# Patient Record
Sex: MALE | Race: WHITE | NOT HISPANIC OR LATINO | Employment: OTHER | URBAN - NONMETROPOLITAN AREA
[De-identification: names, ages, dates, MRNs, and addresses within clinical notes are randomized per-mention and may not be internally consistent; named-entity substitution may affect disease eponyms.]

---

## 2017-01-04 ENCOUNTER — HOSPITAL ENCOUNTER (OUTPATIENT)
Dept: CT IMAGING | Facility: HOSPITAL | Age: 77
Discharge: HOME | End: 2017-01-04
Attending: INTERNAL MEDICINE | Admitting: INTERNAL MEDICINE

## 2017-01-04 DIAGNOSIS — R04.2 HEMOPTYSIS: ICD-10-CM

## 2017-01-04 LAB — CREAT BLDA-MCNC: 1.3 MG/DL (ref 0.6–1.3)

## 2017-01-04 PROCEDURE — 71270 CT THORAX DX C-/C+: CPT

## 2017-01-04 PROCEDURE — 0 IOPAMIDOL 61 % SOLUTION: Performed by: INTERNAL MEDICINE

## 2017-01-04 PROCEDURE — 82565 ASSAY OF CREATININE: CPT

## 2017-01-04 RX ADMIN — IOPAMIDOL 100 ML: 612 INJECTION, SOLUTION INTRAVENOUS at 10:15

## 2017-01-05 ENCOUNTER — TELEPHONE (OUTPATIENT)
Dept: CARDIOLOGY | Facility: CLINIC | Age: 77
End: 2017-01-05

## 2017-01-05 NOTE — TELEPHONE ENCOUNTER
Elvia from Dr. Joyce's office called because pt is asking if Dr. Joyce could fill his Savaysa?  I told Elvia that since we are the ones that started him on this that we should be the ones giving him the rx.  We gave him samples and he was suppose to let us know if he was doing ok so we could send in a rx and give him the $4 coupon card (which he got in the samples).  Pt did have some problems with the med to where he coughed up some blood and he had a ct scan of the chest.  We will check on this and let pt know the outcome and if he needs to stay on the Savaysa or not.  Reese Carrasco, CMA

## 2017-01-10 DIAGNOSIS — I48.20 CHRONIC ATRIAL FIBRILLATION (HCC): Primary | ICD-10-CM

## 2017-01-10 DIAGNOSIS — R91.1 SOLITARY PULMONARY NODULE ON LUNG CT: ICD-10-CM

## 2017-01-10 NOTE — TELEPHONE ENCOUNTER
I called pt with results of the ct and told him Dr. Nguyen wanted to refer him to a pulmonologist.  I sent an order for Dr. Nguyen to sign.      Pt also is asking if we can change the Savaysa to something his insurance will cover and wants us to send in Eliquis because he thinks its on his formulary.  I told pt I would ask. Reese Carrasco, CMA

## 2017-01-19 ENCOUNTER — HOSPITAL ENCOUNTER (OUTPATIENT)
Dept: NUCLEAR MEDICINE | Age: 77
Discharge: HOME | End: 2017-01-19
Payer: MEDICARE

## 2017-01-19 DIAGNOSIS — R91.1 SOLITARY LUNG NODULE: ICD-10-CM

## 2017-01-19 LAB
GLUCOSE BLD-MCNC: 164 MG/DL (ref 70–99)
PERFORMED ON: ABNORMAL

## 2017-01-19 PROCEDURE — 3430000000 HC RX DIAGNOSTIC RADIOPHARMACEUTICAL: Performed by: INTERNAL MEDICINE

## 2017-01-19 PROCEDURE — A9552 F18 FDG: HCPCS | Performed by: INTERNAL MEDICINE

## 2017-01-19 PROCEDURE — 82948 REAGENT STRIP/BLOOD GLUCOSE: CPT

## 2017-01-19 PROCEDURE — 78815 PET IMAGE W/CT SKULL-THIGH: CPT

## 2017-01-19 RX ORDER — FLUDEOXYGLUCOSE F 18 200 MCI/ML
10 INJECTION, SOLUTION INTRAVENOUS
Status: COMPLETED | OUTPATIENT
Start: 2017-01-19 | End: 2017-01-19

## 2017-01-19 RX ADMIN — FLUDEOXYGLUCOSE F 18 10 MILLICURIE: 200 INJECTION, SOLUTION INTRAVENOUS at 12:26

## 2017-08-01 ENCOUNTER — OFFICE VISIT (OUTPATIENT)
Dept: CARDIOLOGY | Facility: CLINIC | Age: 77
End: 2017-08-01

## 2017-08-01 VITALS
DIASTOLIC BLOOD PRESSURE: 62 MMHG | BODY MASS INDEX: 34.63 KG/M2 | WEIGHT: 233.8 LBS | HEART RATE: 62 BPM | HEIGHT: 69 IN | SYSTOLIC BLOOD PRESSURE: 126 MMHG

## 2017-08-01 DIAGNOSIS — I48.0 PAROXYSMAL ATRIAL FIBRILLATION (HCC): ICD-10-CM

## 2017-08-01 DIAGNOSIS — R06.02 SHORTNESS OF BREATH: ICD-10-CM

## 2017-08-01 DIAGNOSIS — I25.810 CORONARY ARTERY DISEASE INVOLVING CORONARY BYPASS GRAFT OF NATIVE HEART WITHOUT ANGINA PECTORIS: Primary | ICD-10-CM

## 2017-08-01 DIAGNOSIS — R06.09 DYSPNEA ON EXERTION: ICD-10-CM

## 2017-08-01 DIAGNOSIS — R07.9 CHEST PAIN, UNSPECIFIED TYPE: ICD-10-CM

## 2017-08-01 DIAGNOSIS — I34.2 NON-RHEUMATIC MITRAL VALVE STENOSIS: ICD-10-CM

## 2017-08-01 PROCEDURE — 99214 OFFICE O/P EST MOD 30 MIN: CPT | Performed by: PHYSICIAN ASSISTANT

## 2017-08-01 RX ORDER — TAMSULOSIN HYDROCHLORIDE 0.4 MG/1
1 CAPSULE ORAL NIGHTLY
Status: ON HOLD | COMMUNITY
End: 2017-11-29

## 2017-08-01 RX ORDER — WARFARIN SODIUM 5 MG/1
5 TABLET ORAL
COMMUNITY

## 2017-08-01 RX ORDER — NITROGLYCERIN 0.4 MG/1
TABLET SUBLINGUAL
Qty: 30 TABLET | Refills: 0 | Status: SHIPPED | OUTPATIENT
Start: 2017-08-01

## 2017-08-01 RX ORDER — FUROSEMIDE 80 MG
40 TABLET ORAL 2 TIMES DAILY
COMMUNITY

## 2017-08-01 RX ORDER — ATORVASTATIN CALCIUM 20 MG/1
20 TABLET, FILM COATED ORAL DAILY
COMMUNITY

## 2017-08-01 RX ORDER — CLOPIDOGREL BISULFATE 75 MG/1
75 TABLET ORAL DAILY
COMMUNITY
End: 2017-11-17

## 2017-08-01 NOTE — PROGRESS NOTES
"    Subjective:     Encounter Date:08/01/2017      Patient ID: Yovani Butler is a 77 y.o. male w hx of coronary artery disease, PAF who [facility]-up. He relates he's had worsening dyspnea on exertion for the past month. He states \"I can hardly walk to the mailbox anymore.\" He reports associated chest pain. He describes this as centrally located, dull ache, at worst 6/10. He denies any radiation of pain. He states he rests for this issue; he denies having any nitroglycerin to take. He also reports dyspnea \"all the time\" but worsened with exertion as noted above. He denies any syncope, leg swelling or similar.     Chief Complaint:  Coronary Artery Disease   Presents for follow-up visit. Symptoms include chest pain and shortness of breath. Pertinent negatives include no dizziness, leg swelling, palpitations or weight gain. Risk factors include premature CAD in family. The symptoms have been stable. Compliance with diet is variable. Compliance with exercise is poor. Compliance with medications is good.   Chest Pain    This is a recurrent problem. The current episode started more than 1 year ago. The onset quality is gradual. The problem occurs every several days. The problem has been gradually worsening. The pain is present in the substernal region. The pain is at a severity of 6/10. The pain is mild. The quality of the pain is described as dull. The pain does not radiate. Associated symptoms include shortness of breath. Pertinent negatives include no claudication, dizziness, hemoptysis, irregular heartbeat, lower extremity edema, malaise/fatigue, nausea, near-syncope, orthopnea, palpitations, PND, syncope, vomiting or weakness. The pain is aggravated by exertion. He has tried rest for the symptoms. The treatment provided moderate relief. Risk factors include male gender.   His past medical history is significant for CAD.   His family medical history is significant for CAD. Prior " diagnostic workup includes Persantine thallium.   Atrial Fibrillation   Presents for follow-up visit. Symptoms include chest pain and shortness of breath. Symptoms are negative for dizziness, palpitations, syncope and weakness. The symptoms have been stable. Past medical history includes atrial fibrillation and CAD. There are no medication compliance problems.   Shortness of Breath   This is a recurrent problem. The current episode started more than 1 month ago. The problem occurs constantly. The problem has been gradually worsening. Associated symptoms include chest pain. Pertinent negatives include no claudication, hemoptysis, leg swelling, orthopnea, PND, syncope or vomiting. The symptoms are aggravated by any activity. The patient has no known risk factors for DVT/PE. The treatment provided mild relief. His past medical history is significant for CAD.       The following portions of the patient's history were reviewed and updated as appropriate: allergies, current medications, past family history, past medical history, past social history, past surgical history and problem list.    Review of Systems   Constitution: Negative for weakness, malaise/fatigue and weight gain.   Cardiovascular: Positive for chest pain and dyspnea on exertion. Negative for claudication, irregular heartbeat, leg swelling, near-syncope, orthopnea, palpitations, paroxysmal nocturnal dyspnea and syncope.   Respiratory: Positive for shortness of breath. Negative for hemoptysis.    Hematologic/Lymphatic: Negative for bleeding problem.   Skin: Negative for poor wound healing.   Musculoskeletal: Negative for myalgias.   Gastrointestinal: Negative for melena, nausea and vomiting.   Genitourinary: Negative for hematuria.   Neurological: Negative for excessive daytime sleepiness, dizziness, focal weakness and light-headedness.   Psychiatric/Behavioral: Negative for memory loss.   All other systems reviewed and are negative.        ECG 12  "Lead  Date/Time: 8/7/2017 12:23 PM  Performed by: EDIL DAVIS  Authorized by: EDIL DAVIS   Comparison: compared with previous ECG from 9/2/2016  Comparison to previous ECG: Atrial fibrillation with rapid ventricular response has replaced sinus bradycardia  Rhythm: atrial fibrillation  Rate: normal  QRS axis: normal  Clinical impression: abnormal ECG               Objective:     Physical Exam   Constitutional: He is oriented to person, place, and time. He appears well-developed and well-nourished.   HENT:   Head: Normocephalic and atraumatic.   Eyes: Conjunctivae and EOM are normal. Pupils are equal, round, and reactive to light.   Neck: Normal range of motion. Neck supple. No JVD present.   Cardiovascular: Normal rate, regular rhythm, S1 normal, S2 normal, intact distal pulses and normal pulses.    Murmur heard.   Systolic murmur is present with a grade of 2/6  at the upper right sternal border, apex  Pulmonary/Chest: Effort normal and breath sounds normal. No respiratory distress.   Abdominal: Soft. Bowel sounds are normal. He exhibits no distension.   Musculoskeletal: He exhibits no edema.   Neurological: He is alert and oriented to person, place, and time.   Skin: Skin is warm and dry.   Psychiatric: He has a normal mood and affect. Judgment normal.   Vitals reviewed.       /62  Pulse 62  Ht 69\" (175.3 cm)  Wt 233 lb 12.8 oz (106 kg)  BMI 34.53 kg/m2    Current Outpatient Prescriptions:   •  atorvastatin (LIPITOR) 20 MG tablet, Take 20 mg by mouth Daily., Disp: , Rfl:   •  clopidogrel (PLAVIX) 75 MG tablet, Take 75 mg by mouth Daily., Disp: , Rfl:   •  digoxin (LANOXIN) 125 MCG tablet, Take  by mouth every day., Disp: , Rfl:   •  famotidine (PEPCID) 20 MG tablet, Take  by mouth daily., Disp: , Rfl:   •  furosemide (LASIX) 80 MG tablet, Take 80 mg by mouth 2 (Two) Times a Day., Disp: , Rfl:   •  glipiZIDE (GLUCOTROL) 10 MG tablet, Take 10 mg by mouth 2 (Two) Times a Day Before Meals., Disp: , Rfl: "   •  LEVOTHYROXINE SODIUM PO, Take  by mouth daily., Disp: , Rfl:   •  metoprolol tartrate (LOPRESSOR) 100 MG tablet, Take 100 mg by mouth 2 (Two) Times a Day., Disp: , Rfl:   •  tamsulosin (FLOMAX) 0.4 MG capsule 24 hr capsule, Take 1 capsule by mouth Every Night., Disp: , Rfl:   •  Unable to find, Med Name: oxygen at night, Disp: , Rfl:   •  warfarin (COUMADIN) 5 MG tablet, Take 5 mg by mouth Daily. MWF- 7mg Dr. Joyce regulates, Disp: , Rfl:   •  aspirin 81 MG tablet, Take 1 tablet by mouth Daily., Disp: 30 tablet, Rfl: 11  •  nitroglycerin (NITROSTAT) 0.4 MG SL tablet, 1 under the tongue as needed for angina, may repeat q5mins for up three doses, Disp: 30 tablet, Rfl: 0  Past Medical History:   Diagnosis Date   • Abnormal glucose     nec   • AF (atrial fibrillation)    • Aortic stenosis    • Aortic valve replaced    • CAD in native artery    • Chest pain    • Diabetes mellitus type 2, uncomplicated    • Esophageal reflux    • Exposure to asbestos     hx personal exposure to asbestos   • Hyperlipidemia    • Hypertension    • Mitral stenosis    • Murmur    • Obesity     nos   • Renal artery stenosis    • Sleep apnea, obstructive     see sleep study 7/12 - intolerant to multiple masks/Bipap   • SOB (shortness of breath)      Past Surgical History:   Procedure Laterality Date   • ADENOIDECTOMY     • BREAST SURGERY      benign tumor left breast   • BYPASS GRAFT  06/16/2009    aortocoronary bypass x1   • CARDIAC VALVE REPLACEMENT  06/16/2009    transplant, heart valve: aortiv valve   • CORONARY ANGIOPLASTY     • CORONARY STENT PLACEMENT     • HERNIA REPAIR     • TONSILLECTOMY       No Known Allergies  Social History     Social History   • Marital status:      Spouse name: N/A   • Number of children: N/A   • Years of education: N/A     Occupational History   • Not on file.     Social History Main Topics   • Smoking status: Former Smoker     Quit date: 1972   • Smokeless tobacco: Former User   • Alcohol use No    • Drug use: No   • Sexual activity: Defer     Other Topics Concern   • Not on file     Social History Narrative     Family History   Problem Relation Age of Onset   • Coronary artery disease Other    • Diabetes Other    • Hypertension Other    • Clotting disorder Mother    • Brain cancer Father    • No Known Problems Sister    • Cancer Brother    • Heart disease Maternal Grandfather    • Diabetes Maternal Grandfather    • Heart disease Sister    • Hypertension Sister    • Alzheimer's disease Sister    • Heart disease Sister    • Heart failure Sister    • Heart attack Sister             Assessment:          Diagnosis Plan   1. Coronary artery disease involving coronary bypass graft of native heart without angina pectoris     2. Paroxysmal atrial fibrillation     3. Non-rheumatic mitral valve stenosis     4. Chest pain, unspecified type  Stress Test With Myocardial Perfusion   5. Dyspnea on exertion  Stress Test With Myocardial Perfusion   6. Shortness of breath  Adult Transthoracic Echo Complete With Contrast          Plan:       1. NTG as needed, up to 3 within 15 minutes, if chest pain unrelieved go to ER.   2. Instructed patient he should only be taking Lopressor 100 mg twice daily.   3. Lexiscan next week  4. Echo tomorrow  5. Follow-up in 3 months with Dr. Nguyen unless needed sooner- any increase in chest pain, dyspnea or related, please call/return sooner  6. Verbalized understanding of instructions.

## 2017-08-07 PROCEDURE — 93000 ELECTROCARDIOGRAM COMPLETE: CPT | Performed by: PHYSICIAN ASSISTANT

## 2017-08-14 ENCOUNTER — HOSPITAL ENCOUNTER (OUTPATIENT)
Dept: CARDIOLOGY | Facility: HOSPITAL | Age: 77
Discharge: HOME | End: 2017-08-14

## 2017-08-14 ENCOUNTER — HOSPITAL ENCOUNTER (OUTPATIENT)
Dept: CARDIOLOGY | Facility: HOSPITAL | Age: 77
Discharge: HOME | End: 2017-08-14
Admitting: PHYSICIAN ASSISTANT

## 2017-08-14 VITALS — DIASTOLIC BLOOD PRESSURE: 78 MMHG | HEART RATE: 52 BPM | SYSTOLIC BLOOD PRESSURE: 140 MMHG

## 2017-08-14 VITALS
HEIGHT: 69 IN | BODY MASS INDEX: 34.51 KG/M2 | WEIGHT: 233 LBS | DIASTOLIC BLOOD PRESSURE: 62 MMHG | SYSTOLIC BLOOD PRESSURE: 126 MMHG

## 2017-08-14 DIAGNOSIS — R06.09 DYSPNEA ON EXERTION: ICD-10-CM

## 2017-08-14 DIAGNOSIS — R07.9 CHEST PAIN, UNSPECIFIED TYPE: ICD-10-CM

## 2017-08-14 DIAGNOSIS — R06.02 SHORTNESS OF BREATH: ICD-10-CM

## 2017-08-14 LAB
BH CV ECHO MEAS - AO MAX PG (FULL): 14 MMHG
BH CV ECHO MEAS - AO MAX PG: 17.5 MMHG
BH CV ECHO MEAS - AO MEAN PG (FULL): 6 MMHG
BH CV ECHO MEAS - AO MEAN PG: 8 MMHG
BH CV ECHO MEAS - AO ROOT AREA (BSA CORRECTED): 1.3
BH CV ECHO MEAS - AO ROOT AREA: 6.6 CM^2
BH CV ECHO MEAS - AO ROOT DIAM: 2.9 CM
BH CV ECHO MEAS - AO V2 MAX: 209 CM/SEC
BH CV ECHO MEAS - AO V2 MEAN: 119 CM/SEC
BH CV ECHO MEAS - AO V2 VTI: 48.1 CM
BH CV ECHO MEAS - AVA(I,A): 1.8 CM^2
BH CV ECHO MEAS - AVA(I,D): 1.8 CM^2
BH CV ECHO MEAS - AVA(V,A): 1.6 CM^2
BH CV ECHO MEAS - AVA(V,D): 1.6 CM^2
BH CV ECHO MEAS - BSA(HAYCOCK): 2.3 M^2
BH CV ECHO MEAS - BSA: 2.2 M^2
BH CV ECHO MEAS - BZI_BMI: 34.4 KILOGRAMS/M^2
BH CV ECHO MEAS - BZI_METRIC_HEIGHT: 175.3 CM
BH CV ECHO MEAS - BZI_METRIC_WEIGHT: 105.7 KG
BH CV ECHO MEAS - CONTRAST EF 4CH: 60 ML/M^2
BH CV ECHO MEAS - EDV(CUBED): 148.9 ML
BH CV ECHO MEAS - EDV(MOD-SP4): 106 ML
BH CV ECHO MEAS - EDV(TEICH): 135.3 ML
BH CV ECHO MEAS - EF(CUBED): 70.7 %
BH CV ECHO MEAS - EF(MOD-SP4): 60 %
BH CV ECHO MEAS - EF(TEICH): 61.9 %
BH CV ECHO MEAS - ESV(CUBED): 43.6 ML
BH CV ECHO MEAS - ESV(MOD-SP4): 42.4 ML
BH CV ECHO MEAS - ESV(TEICH): 51.6 ML
BH CV ECHO MEAS - FS: 33.6 %
BH CV ECHO MEAS - IVS/LVPW: 0.93
BH CV ECHO MEAS - IVSD: 1.1 CM
BH CV ECHO MEAS - LA DIMENSION: 5 CM
BH CV ECHO MEAS - LA/AO: 1.7
BH CV ECHO MEAS - LV DIASTOLIC VOL/BSA (35-75): 48.1 ML/M^2
BH CV ECHO MEAS - LV MASS(C)D: 249.2 GRAMS
BH CV ECHO MEAS - LV MASS(C)DI: 113.1 GRAMS/M^2
BH CV ECHO MEAS - LV MAX PG: 3.5 MMHG
BH CV ECHO MEAS - LV MEAN PG: 2 MMHG
BH CV ECHO MEAS - LV SYSTOLIC VOL/BSA (12-30): 19.2 ML/M^2
BH CV ECHO MEAS - LV V1 MAX: 93.8 CM/SEC
BH CV ECHO MEAS - LV V1 MEAN: 62.5 CM/SEC
BH CV ECHO MEAS - LV V1 VTI: 24.9 CM
BH CV ECHO MEAS - LVIDD: 5.3 CM
BH CV ECHO MEAS - LVIDS: 3.5 CM
BH CV ECHO MEAS - LVLD AP4: 7.9 CM
BH CV ECHO MEAS - LVLS AP4: 6.7 CM
BH CV ECHO MEAS - LVOT AREA (M): 3.5 CM^2
BH CV ECHO MEAS - LVOT AREA: 3.5 CM^2
BH CV ECHO MEAS - LVOT DIAM: 2.1 CM
BH CV ECHO MEAS - LVPWD: 1.2 CM
BH CV ECHO MEAS - MR ALIAS VEL: 30.3 CM/SEC
BH CV ECHO MEAS - MR ERO: 0.06 CM^2
BH CV ECHO MEAS - MR FLOW RATE: 30.5 CM^3/SEC
BH CV ECHO MEAS - MR MAX PG: 106.9 MMHG
BH CV ECHO MEAS - MR MAX VEL: 517 CM/SEC
BH CV ECHO MEAS - MR MEAN PG: 72 MMHG
BH CV ECHO MEAS - MR MEAN VEL: 398 CM/SEC
BH CV ECHO MEAS - MR PISA RADIUS: 0.4 CM
BH CV ECHO MEAS - MR PISA: 1 CM^2
BH CV ECHO MEAS - MR VOLUME: 10.3 ML
BH CV ECHO MEAS - MR VTI: 175 CM
BH CV ECHO MEAS - MV DEC SLOPE: 452.4 CM/SEC^2
BH CV ECHO MEAS - MV E MAX VEL: 235 CM/SEC
BH CV ECHO MEAS - MV MAX PG: 22.1 MMHG
BH CV ECHO MEAS - MV MEAN PG: 11 MMHG
BH CV ECHO MEAS - MV P1/2T MAX VEL: 267.2 CM/SEC
BH CV ECHO MEAS - MV P1/2T: 173 MSEC
BH CV ECHO MEAS - MV V2 MAX: 235 CM/SEC
BH CV ECHO MEAS - MV V2 MEAN: 151 CM/SEC
BH CV ECHO MEAS - MV V2 VTI: 73.5 CM
BH CV ECHO MEAS - MVA P1/2T LCG: 0.82 CM^2
BH CV ECHO MEAS - MVA(P1/2T): 1.3 CM^2
BH CV ECHO MEAS - MVA(VTI): 1.2 CM^2
BH CV ECHO MEAS - RAP SYSTOLE: 10 MMHG
BH CV ECHO MEAS - RVSP: 82.9 MMHG
BH CV ECHO MEAS - SI(AO): 144.1 ML/M^2
BH CV ECHO MEAS - SI(CUBED): 47.8 ML/M^2
BH CV ECHO MEAS - SI(LVOT): 39.1 ML/M^2
BH CV ECHO MEAS - SI(MOD-SP4): 28.9 ML/M^2
BH CV ECHO MEAS - SI(TEICH): 38 ML/M^2
BH CV ECHO MEAS - SV(AO): 317.7 ML
BH CV ECHO MEAS - SV(CUBED): 105.3 ML
BH CV ECHO MEAS - SV(LVOT): 86.2 ML
BH CV ECHO MEAS - SV(MOD-SP4): 63.6 ML
BH CV ECHO MEAS - SV(TEICH): 83.8 ML
BH CV ECHO MEAS - TR MAX VEL: 427 CM/SEC
LEFT ATRIUM VOLUME INDEX: 29.9 ML/M2
LEFT ATRIUM VOLUME: 65.8 CM3
LV EF 2D ECHO EST: 60 %

## 2017-08-14 PROCEDURE — 0 TECHNETIUM SESTAMIBI: Performed by: INTERNAL MEDICINE

## 2017-08-14 PROCEDURE — 25010000002 PERFLUTREN 6.52 MG/ML SUSPENSION: Performed by: INTERNAL MEDICINE

## 2017-08-14 PROCEDURE — 93306 TTE W/DOPPLER COMPLETE: CPT | Performed by: INTERNAL MEDICINE

## 2017-08-14 PROCEDURE — A9500 TC99M SESTAMIBI: HCPCS | Performed by: INTERNAL MEDICINE

## 2017-08-14 PROCEDURE — 93017 CV STRESS TEST TRACING ONLY: CPT

## 2017-08-14 PROCEDURE — 78452 HT MUSCLE IMAGE SPECT MULT: CPT | Performed by: INTERNAL MEDICINE

## 2017-08-14 PROCEDURE — C8929 TTE W OR WO FOL WCON,DOPPLER: HCPCS

## 2017-08-14 PROCEDURE — 78452 HT MUSCLE IMAGE SPECT MULT: CPT

## 2017-08-14 PROCEDURE — 93018 CV STRESS TEST I&R ONLY: CPT | Performed by: INTERNAL MEDICINE

## 2017-08-14 RX ADMIN — TECHNETIUM TC-99M SESTAMIBI 1 DOSE: 1 INJECTION INTRAVENOUS at 07:41

## 2017-08-14 RX ADMIN — TECHNETIUM TC-99M SESTAMIBI 1 DOSE: 1 INJECTION INTRAVENOUS at 09:53

## 2017-08-14 RX ADMIN — PERFLUTREN 8.48 MG: 6.52 INJECTION, SUSPENSION INTRAVENOUS at 08:16

## 2017-08-15 DIAGNOSIS — I27.20 PULMONARY HYPERTENSION (HCC): Primary | ICD-10-CM

## 2017-08-16 ENCOUNTER — TELEPHONE (OUTPATIENT)
Dept: CARDIOLOGY | Facility: CLINIC | Age: 77
End: 2017-08-16

## 2017-08-16 LAB
BH CV NUCLEAR PRIOR STUDY: 1
BH CV STRESS BP STAGE 1: NORMAL
BH CV STRESS COMMENTS STAGE 1: NORMAL
BH CV STRESS DOSE REGADENOSON STAGE 1: 0.4
BH CV STRESS DURATION MIN STAGE 1: 0
BH CV STRESS DURATION SEC STAGE 1: 10
BH CV STRESS HR STAGE 1: 62
BH CV STRESS PROTOCOL 1: NORMAL
BH CV STRESS RECOVERY BP: NORMAL MMHG
BH CV STRESS RECOVERY HR: 65 BPM
BH CV STRESS STAGE 1: 1
LV EF NUC BP: 85 %
MAXIMAL PREDICTED HEART RATE: 144 BPM
PERCENT MAX PREDICTED HR: 43.06 %
STRESS BASELINE BP: NORMAL MMHG
STRESS BASELINE HR: 52 BPM
STRESS PERCENT HR: 51 %
STRESS POST EXERCISE DUR MIN: 0 MIN
STRESS POST EXERCISE DUR SEC: 10 SEC
STRESS POST PEAK BP: NORMAL MMHG
STRESS POST PEAK HR: 62 BPM
STRESS TARGET HR: 122 BPM

## 2017-08-16 NOTE — TELEPHONE ENCOUNTER
----- Message from Carol Ross PA-C sent at 8/15/2017  3:18 PM CDT -----  Please let this patient know I have referred him to pulmonology for a finding on his echo that shows a potential lung issue.     Thanks,  Carol

## 2017-09-06 ENCOUNTER — HOSPITAL ENCOUNTER (OUTPATIENT)
Dept: CT IMAGING | Age: 77
Discharge: HOME | End: 2017-09-06
Payer: MEDICARE

## 2017-09-06 DIAGNOSIS — R91.1 SOLITARY LUNG NODULE: ICD-10-CM

## 2017-09-06 PROCEDURE — 71250 CT THORAX DX C-: CPT

## 2017-09-19 ENCOUNTER — OFFICE VISIT (OUTPATIENT)
Dept: GASTROENTEROLOGY | Facility: CLINIC | Age: 77
End: 2017-09-19

## 2017-09-19 VITALS
DIASTOLIC BLOOD PRESSURE: 76 MMHG | BODY MASS INDEX: 34.07 KG/M2 | WEIGHT: 230 LBS | OXYGEN SATURATION: 96 % | SYSTOLIC BLOOD PRESSURE: 130 MMHG | HEART RATE: 70 BPM | HEIGHT: 69 IN | TEMPERATURE: 97.3 F

## 2017-09-19 DIAGNOSIS — Z79.01 ANTICOAGULATED: ICD-10-CM

## 2017-09-19 DIAGNOSIS — K22.70 BARRETT'S ESOPHAGUS DETERMINED BY BIOPSY: Primary | ICD-10-CM

## 2017-09-19 DIAGNOSIS — R13.19 OTHER DYSPHAGIA: ICD-10-CM

## 2017-09-19 DIAGNOSIS — K21.9 GASTROESOPHAGEAL REFLUX DISEASE, ESOPHAGITIS PRESENCE NOT SPECIFIED: ICD-10-CM

## 2017-09-19 PROCEDURE — 99214 OFFICE O/P EST MOD 30 MIN: CPT | Performed by: NURSE PRACTITIONER

## 2017-09-19 RX ORDER — GABAPENTIN 100 MG/1
100 CAPSULE ORAL 3 TIMES DAILY
COMMUNITY
End: 2018-01-01

## 2017-09-19 NOTE — PROGRESS NOTES
"  Chief Complaint   Patient presents with   • Endoscopy     10-8-14 had endo 's     Subjective   HPI  Yovani Butler is a 77 y.o. male who presents with hx of GERD for several years.  This is described as stable.   Pt is maintained on Pepcid daily.  Pt has a hx of Barretts esophagus determined by biopsy more than 10 yr ago. The course is constant.  Last EGD from 2014 (histologically bx positive for intestinal metaplasia, endoscopically Barretts was stable) reviewed.  He does not have complaints of :N/V, no changes in bowels, no wt loss, no BRBPR.  No melena.  No abdominal pain.    He reports difficulty swallowing liquids, no problems with solid foods.  He states liquid will go down then \"will come back up.\"  He has experienced hemoptysis and has been evaluated by pulmonology.      CScope (Dr Smallwood) 2015-diverticulosis    Stress test 8/1/17-results reviewed-impression consistent with low risk study    Past Medical History:   Diagnosis Date   • Abnormal glucose     nec   • AF (atrial fibrillation)    • Aortic stenosis    • Aortic valve replaced    • CAD in native artery    • Chest pain    • Diabetes mellitus type 2, uncomplicated    • Esophageal reflux    • Exposure to asbestos     hx personal exposure to asbestos   • Hyperlipidemia    • Hypertension    • Mitral stenosis    • Murmur    • Obesity     nos   • Renal artery stenosis    • Sleep apnea, obstructive     see sleep study 7/12 - intolerant to multiple masks/Bipap   • SOB (shortness of breath)      Outpatient Prescriptions Marked as Taking for the 9/19/17 encounter (Office Visit) with MANJULA Mcclain   Medication Sig Dispense Refill   • aspirin 81 MG tablet Take 1 tablet by mouth Daily. 30 tablet 11   • atorvastatin (LIPITOR) 20 MG tablet Take 20 mg by mouth Daily.     • famotidine (PEPCID) 20 MG tablet Take  by mouth daily.     • furosemide (LASIX) 80 MG tablet Take 80 mg by mouth 2 (Two) Times a Day.     • gabapentin (NEURONTIN) 100 MG capsule " Take 100 mg by mouth 3 (Three) Times a Day.     • glipiZIDE (GLUCOTROL) 10 MG tablet Take 10 mg by mouth 2 (Two) Times a Day Before Meals.     • LEVOTHYROXINE SODIUM PO Take  by mouth daily.     • metoprolol tartrate (LOPRESSOR) 100 MG tablet Take 100 mg by mouth 2 (Two) Times a Day.     • nitroglycerin (NITROSTAT) 0.4 MG SL tablet 1 under the tongue as needed for angina, may repeat q5mins for up three doses 30 tablet 0   • warfarin (COUMADIN) 5 MG tablet Take 5 mg by mouth Daily. MWF- 7mg  Dr. Joyce regulates       No Known Allergies  Social History     Social History   • Marital status:      Spouse name: N/A   • Number of children: N/A   • Years of education: N/A     Occupational History   • Not on file.     Social History Main Topics   • Smoking status: Former Smoker     Quit date: 1978   • Smokeless tobacco: Never Used   • Alcohol use Yes      Comment: occ.   • Drug use: No   • Sexual activity: Defer     Other Topics Concern   • Not on file     Social History Narrative     Family History   Problem Relation Age of Onset   • Coronary artery disease Other    • Diabetes Other    • Hypertension Other    • Clotting disorder Mother    • Brain cancer Father    • No Known Problems Sister    • Cancer Brother    • Heart disease Maternal Grandfather    • Diabetes Maternal Grandfather    • Heart disease Sister    • Hypertension Sister    • Alzheimer's disease Sister    • Heart disease Sister    • Heart failure Sister    • Heart attack Sister    • Colon cancer Neg Hx    • Esophageal cancer Neg Hx      Review of Systems   Constitutional: Negative for fatigue, fever and unexpected weight change.   HENT: Negative for hearing loss, sore throat and voice change.    Eyes: Negative for visual disturbance.   Respiratory: Positive for cough. Negative for shortness of breath and wheezing.    Cardiovascular: Negative for chest pain and palpitations.   Gastrointestinal: Negative for abdominal pain, blood in stool and vomiting.  "  Endocrine: Negative for polydipsia and polyuria.   Genitourinary: Negative for difficulty urinating, dysuria, hematuria and urgency.   Musculoskeletal: Negative for joint swelling and myalgias.   Skin: Negative for color change, rash and wound.   Neurological: Negative for dizziness, tremors, seizures and syncope.   Hematological: Does not bruise/bleed easily.   Psychiatric/Behavioral: Negative for agitation and confusion. The patient is not nervous/anxious.      Objective   Vitals:    09/19/17 0803   BP: 130/76   Pulse: 70   Temp: 97.3 °F (36.3 °C)   SpO2: 96%   Weight: 230 lb (104 kg)   Height: 69\" (175.3 cm)     Physical Exam   Constitutional: He is oriented to person, place, and time. He appears well-developed and well-nourished.   HENT:   Head: Normocephalic and atraumatic.   Eyes:   Pink, Nonicteric   Neck:   Global Assessment- supple. No JVD or lymphadenopathy   Cardiovascular: Normal rate, regular rhythm and normal heart sounds.  Exam reveals no gallop and no friction rub.    No murmur heard.  Pulmonary/Chest: Effort normal and breath sounds normal. No respiratory distress. He has no wheezes. He has no rales.   Inspection: Movements-Symmetrical   Abdominal: Soft. Bowel sounds are normal. He exhibits no distension and no mass. There is no tenderness. There is no rebound and no guarding.   Neurological: He is alert and oriented to person, place, and time.   General Exam-Deemed a reliable historian, able to converse without difficulty and Able to move all extremities without difficulty     Imaging Results (most recent)     None        Assessment/Plan   Ray was seen today for endoscopy.    Diagnoses and all orders for this visit:    Madden's esophagus determined by biopsy  -     Case Request; Standing  -     Implement Anesthesia Orders Day of Procedure; Standing  -     Obtain Informed Consent; Standing  -     Case Request    Gastroesophageal reflux disease, esophagitis presence not " specified    Anticoagulated    Other dysphagia  -     FL Esophagram Complete      ESOPHAGOGASTRODUODENOSCOPY WITH ANESTHESIA (N/A)     Esophagram to determine anatomical normality prior to EGD    Patient is to continue all blood pressure and cardiac medications prior to procedure.     Patient is to hold their anticoagulation medication per the direction of their prescribing physician, Dr Nguyen. This is to prevent any risk or complication from bleeding intra and post procedure. If they develop bleeding post procedure they are to go the emergency department for further evaluation and treatment immediately.  I have also requested cardio clearance    Advised pt to stop ASA day prior to procedure and to stop use of NSAIDs, Fish Oil, and MV 5 days prior to procedure.  Tylenol based products are ok to take.  Pt verbalized understanding.    The risk of the endoscopy were discussed in detail.  We discussed the risk of perforation (one out of 1883-6054, riskier with dilation), bleeding (one out of 500), and the rare risks of infection, adverse reaction to anesthesia, respiratory failure, cardiac failure including MI and adverse reaction to medications, etc.  We discussed consequences that could occur if a risk were to develop such as the need for hospitalization, blood transfusion, surgical intervention, medications, pain and disability and death.  Alternatives include not doing anything, or pursuing an UGI series which only offers a diagnosis with potential less accuracy compared to egd.  The patient verbalizes understanding and agrees to proceed.

## 2017-09-20 ENCOUNTER — HOSPITAL ENCOUNTER (OUTPATIENT)
Dept: GENERAL RADIOLOGY | Facility: HOSPITAL | Age: 77
Discharge: HOME | End: 2017-09-20
Admitting: NURSE PRACTITIONER

## 2017-09-20 PROBLEM — K22.70 BARRETT'S ESOPHAGUS DETERMINED BY BIOPSY: Status: ACTIVE | Noted: 2017-09-20

## 2017-09-20 PROCEDURE — 74220 X-RAY XM ESOPHAGUS 1CNTRST: CPT

## 2017-09-21 RX ORDER — AMOXICILLIN 500 MG/1
2000 TABLET, FILM COATED ORAL ONCE
Qty: 4 TABLET | Refills: 0 | Status: SHIPPED | OUTPATIENT
Start: 2017-09-21 | End: 2017-09-21

## 2017-10-24 ENCOUNTER — TELEPHONE (OUTPATIENT)
Dept: GASTROENTEROLOGY | Facility: CLINIC | Age: 77
End: 2017-10-24

## 2017-10-24 NOTE — TELEPHONE ENCOUNTER
Called spoke to pt informed him that he needed to be off coumadin 5 days before procedure and stop aspirin day before procedure and take blood pressure meds day of procedure also informed him he needed to take antibiotics day of procedure due to mv stenosis pt voiced understanding

## 2017-11-01 ENCOUNTER — ANESTHESIA (OUTPATIENT)
Dept: GASTROENTEROLOGY | Facility: HOSPITAL | Age: 77
End: 2017-11-01

## 2017-11-01 ENCOUNTER — HOSPITAL ENCOUNTER (OUTPATIENT)
Facility: HOSPITAL | Age: 77
Setting detail: HOSPITAL OUTPATIENT SURGERY
Discharge: HOME | End: 2017-11-01
Attending: INTERNAL MEDICINE | Admitting: INTERNAL MEDICINE

## 2017-11-01 ENCOUNTER — ANESTHESIA EVENT (OUTPATIENT)
Dept: GASTROENTEROLOGY | Facility: HOSPITAL | Age: 77
End: 2017-11-01

## 2017-11-01 ENCOUNTER — TELEPHONE (OUTPATIENT)
Dept: GASTROENTEROLOGY | Facility: CLINIC | Age: 77
End: 2017-11-01

## 2017-11-01 VITALS
RESPIRATION RATE: 18 BRPM | OXYGEN SATURATION: 95 % | BODY MASS INDEX: 33.92 KG/M2 | HEART RATE: 62 BPM | SYSTOLIC BLOOD PRESSURE: 122 MMHG | WEIGHT: 229 LBS | HEIGHT: 69 IN | DIASTOLIC BLOOD PRESSURE: 71 MMHG | TEMPERATURE: 97.2 F

## 2017-11-01 DIAGNOSIS — K22.70 BARRETT'S ESOPHAGUS DETERMINED BY BIOPSY: ICD-10-CM

## 2017-11-01 LAB — GLUCOSE BLDC GLUCOMTR-MCNC: 132 MG/DL (ref 70–130)

## 2017-11-01 PROCEDURE — 43239 EGD BIOPSY SINGLE/MULTIPLE: CPT | Performed by: INTERNAL MEDICINE

## 2017-11-01 PROCEDURE — 87081 CULTURE SCREEN ONLY: CPT | Performed by: INTERNAL MEDICINE

## 2017-11-01 PROCEDURE — 82962 GLUCOSE BLOOD TEST: CPT

## 2017-11-01 PROCEDURE — 25010000002 PROPOFOL 10 MG/ML EMULSION: Performed by: NURSE ANESTHETIST, CERTIFIED REGISTERED

## 2017-11-01 RX ORDER — SODIUM CHLORIDE 9 MG/ML
500 INJECTION, SOLUTION INTRAVENOUS CONTINUOUS PRN
Status: DISCONTINUED | OUTPATIENT
Start: 2017-11-01 | End: 2017-11-01 | Stop reason: HOSPADM

## 2017-11-01 RX ORDER — PROPOFOL 10 MG/ML
VIAL (ML) INTRAVENOUS AS NEEDED
Status: DISCONTINUED | OUTPATIENT
Start: 2017-11-01 | End: 2017-11-01 | Stop reason: SURG

## 2017-11-01 RX ORDER — SODIUM CHLORIDE 0.9 % (FLUSH) 0.9 %
3 SYRINGE (ML) INJECTION AS NEEDED
Status: DISCONTINUED | OUTPATIENT
Start: 2017-11-01 | End: 2017-11-01 | Stop reason: HOSPADM

## 2017-11-01 RX ADMIN — SODIUM CHLORIDE 500 ML: 9 INJECTION, SOLUTION INTRAVENOUS at 09:27

## 2017-11-01 RX ADMIN — PROPOFOL 50 MG: 10 INJECTION, EMULSION INTRAVENOUS at 09:58

## 2017-11-01 RX ADMIN — PROPOFOL 50 MG: 10 INJECTION, EMULSION INTRAVENOUS at 09:59

## 2017-11-01 RX ADMIN — PROPOFOL 50 MG: 10 INJECTION, EMULSION INTRAVENOUS at 10:01

## 2017-11-01 NOTE — ANESTHESIA POSTPROCEDURE EVALUATION
Patient: Yovani Butler    Procedure Summary     Date Anesthesia Start Anesthesia Stop Room / Location    11/01/17 0954 1007 [facility] ENDOSCOPY 6 / [facility]       Procedure Diagnosis Surgeon Provider    ESOPHAGOGASTRODUODENOSCOPY WITH ANESTHESIA (N/A Esophagus) Madden's esophagus determined by biopsy  (Madden's esophagus determined by biopsy [K22.70]) DO Jeremi Mendoza CRNA          Anesthesia Type: general  Last vitals  BP   99/54 (11/01/17 1009)   Temp   97.2 °F (36.2 °C) (11/01/17 0908)   Pulse   64 (11/01/17 1009)   Resp   17 (11/01/17 1009)     SpO2   91 % (11/01/17 1009)     Post Anesthesia Care and Evaluation    Patient location during evaluation: PHASE II  Patient participation: complete - patient participated  Level of consciousness: awake and sleepy but conscious  Pain score: 0  Pain management: adequate  Airway patency: patent  Anesthetic complications: No anesthetic complications    Cardiovascular status: acceptable  Respiratory status: acceptable  Hydration status: acceptable

## 2017-11-01 NOTE — ANESTHESIA PREPROCEDURE EVALUATION
Anesthesia Evaluation     Patient summary reviewed and Nursing notes reviewed   no history of anesthetic complications:  NPO Solid Status: > 8 hours  NPO Liquid Status: > 8 hours     Airway   Mallampati: I  TM distance: >3 FB  Neck ROM: full  no difficulty expected  Dental          Pulmonary     breath sounds clear to auscultation  (+) shortness of breath, sleep apnea (uses o2 at night),   (-) COPD, asthma, not a smoker  Cardiovascular   Exercise tolerance: poor (<4 METS)    Patient on routine beta blocker and Beta blocker given within 24 hours of surgery  Rhythm: regular  Rate: normal    (+) hypertension, valvular problems/murmurs (as s/p valve replcement. Has continud mitral stensos- moderte, with signiicant shortness of breath. Seeing CT surgery in two week) AS and MS, CAD, cardiac stents (two stents placed 2015) more than 12 months ago dysrhythmias, PVD (s/p renal artery stent), hyperlipidemia      Neuro/Psych  (-) seizures, TIA, CVA  GI/Hepatic/Renal/Endo    (+) obesity,  GERD, diabetes mellitus, hypothyroidism,   (-) liver disease, no renal disease    Musculoskeletal     Abdominal    Substance History      OB/GYN          Other   (+) arthritis                                 Anesthesia Plan    ASA 3     general   (Egd to eval hemoptysis. Pt has moderate mitral stenosis getting referred for surgery. Maintain low hr, map at baseline. )  intravenous induction   Anesthetic plan and risks discussed with patient.

## 2017-11-01 NOTE — H&P
[facility] Gastroenterology  Pre Procedure History & Physical    Chief Complaint:   's    Subjective     HPI:   's    Past Medical History:   Past Medical History:   Diagnosis Date   • Abnormal glucose     nec   • AF (atrial fibrillation)    • Aortic stenosis    • Aortic valve replaced    • Arthritis    • CAD in native artery    • Chest pain    • Diabetes mellitus type 2, uncomplicated    • Disease of thyroid gland    • Esophageal reflux    • Exposure to asbestos     hx personal exposure to asbestos   • Hyperlipidemia    • Hypertension    • Mitral stenosis    • Murmur    • Obesity     nos   • Renal artery stenosis    • Sleep apnea, obstructive     see sleep study 7/12 - intolerant to multiple masks/Bipap   • SOB (shortness of breath)        Past Surgical History:  [unfilled]    Family History:  Family History   Problem Relation Age of Onset   • Coronary artery disease Other    • Diabetes Other    • Hypertension Other    • Clotting disorder Mother    • Brain cancer Father    • No Known Problems Sister    • Cancer Brother    • Heart disease Maternal Grandfather    • Diabetes Maternal Grandfather    • Heart disease Sister    • Hypertension Sister    • Alzheimer's disease Sister    • Heart disease Sister    • Heart failure Sister    • Heart attack Sister    • Colon cancer Neg Hx    • Esophageal cancer Neg Hx        Social History:   reports that he quit smoking about 39 years ago. He has never used smokeless tobacco. He reports that he drinks alcohol. He reports that he does not use illicit drugs.    Medications:   Prior to Admission medications    Medication Sig Start Date End Date Taking? Authorizing Provider   famotidine (PEPCID) 20 MG tablet Take  by mouth daily.   Yes Historical Provider, MD   furosemide (LASIX) 80 MG tablet Take 80 mg by mouth 2 (Two) Times a Day.   Yes Historical Provider, MD   gabapentin (NEURONTIN) 100 MG capsule Take 100 mg by mouth 3 (Three) Times a Day.   Yes Historical  "Provider, MD   LEVOTHYROXINE SODIUM PO Take  by mouth daily.   Yes Historical Provider, MD   metoprolol tartrate (LOPRESSOR) 100 MG tablet Take 100 mg by mouth 2 (Two) Times a Day.   Yes Historical Provider, MD   aspirin 81 MG tablet Take 1 tablet by mouth Daily. 8/1/17   Carol Ross PA-C   atorvastatin (LIPITOR) 20 MG tablet Take 20 mg by mouth Daily.    Historical Provider, MD   clopidogrel (PLAVIX) 75 MG tablet Take 75 mg by mouth Daily.    Historical Provider, MD   digoxin (LANOXIN) 125 MCG tablet Take  by mouth every day.    Historical Provider, MD   glipiZIDE (GLUCOTROL) 10 MG tablet Take 10 mg by mouth 2 (Two) Times a Day Before Meals.    Historical Provider, MD   nitroglycerin (NITROSTAT) 0.4 MG SL tablet 1 under the tongue as needed for angina, may repeat q5mins for up three doses 8/1/17   Carol Ross PA-C   tamsulosin (FLOMAX) 0.4 MG capsule 24 hr capsule Take 1 capsule by mouth Every Night.    Historical Provider, MD   Unable to find Med Name: oxygen at night    Historical Provider, MD   warfarin (COUMADIN) 5 MG tablet Take 5 mg by mouth Daily. MWF- 7mg  Dr. Joyce regulates    Historical Provider, MD       Allergies:  Review of patient's allergies indicates no known allergies.    Objective     Blood pressure 129/75, pulse 70, temperature 97.2 °F (36.2 °C), temperature source Temporal Artery , resp. rate 20, height 69\" (175.3 cm), weight 229 lb (104 kg), SpO2 95 %.    Physical Exam   Constitutional: Pt is oriented to person, place, and in no distress.   HENT: Mouth/Throat: Oropharynx is clear.   Cardiovascular: Normal rate, regular rhythm.    Pulmonary/Chest: Effort normal. No respiratory distress. No  wheezes.   Abdominal: Soft. Non-distended.  Skin: Skin is warm and dry.   Psychiatric: Mood, memory, affect and judgment appear normal.     Assessment/Plan     Diagnosis:  's    Anticipated Surgical Procedure:  EGD    The risks, benefits, and alternatives of this procedure have been discussed " with the patient or the responsible party- the patient understands and agrees to proceed.

## 2017-11-01 NOTE — PLAN OF CARE
Problem: Patient Care Overview (Adult)  Goal: Plan of Care Review  Outcome: Ongoing (interventions implemented as appropriate)    11/01/17 0959   Coping/Psychosocial Response Interventions   Plan Of Care Reviewed With patient   Patient Care Overview   Progress no change   Outcome Evaluation   Outcome Summary/Follow up Plan tolerating well         Problem: GI Endoscopy (Adult)  Goal: Signs and Symptoms of Listed Potential Problems Will be Absent or Manageable (GI Endoscopy)  Outcome: Ongoing (interventions implemented as appropriate)

## 2017-11-02 LAB — UREASE TISS QL: NEGATIVE

## 2017-11-17 ENCOUNTER — PREP FOR SURGERY (OUTPATIENT)
Dept: OTHER | Facility: HOSPITAL | Age: 77
End: 2017-11-17

## 2017-11-17 ENCOUNTER — OFFICE VISIT (OUTPATIENT)
Dept: CARDIOLOGY | Facility: CLINIC | Age: 77
End: 2017-11-17

## 2017-11-17 VITALS
HEART RATE: 54 BPM | SYSTOLIC BLOOD PRESSURE: 140 MMHG | HEIGHT: 69 IN | BODY MASS INDEX: 34.36 KG/M2 | WEIGHT: 232 LBS | DIASTOLIC BLOOD PRESSURE: 78 MMHG

## 2017-11-17 DIAGNOSIS — Z95.2 H/O AORTIC VALVE REPLACEMENT: ICD-10-CM

## 2017-11-17 DIAGNOSIS — I05.0 SEVERE MITRAL VALVE STENOSIS: Primary | ICD-10-CM

## 2017-11-17 DIAGNOSIS — R06.02 SHORTNESS OF BREATH: Primary | ICD-10-CM

## 2017-11-17 DIAGNOSIS — R06.02 SHORTNESS OF BREATH: ICD-10-CM

## 2017-11-17 DIAGNOSIS — I34.2 NON-RHEUMATIC MITRAL VALVE STENOSIS: ICD-10-CM

## 2017-11-17 DIAGNOSIS — I27.20 PULMONARY HYPERTENSION, MODERATE TO SEVERE (HCC): ICD-10-CM

## 2017-11-17 DIAGNOSIS — R07.2 PRECORDIAL PAIN: ICD-10-CM

## 2017-11-17 DIAGNOSIS — I25.810 CORONARY ARTERY DISEASE INVOLVING CORONARY BYPASS GRAFT OF NATIVE HEART WITHOUT ANGINA PECTORIS: ICD-10-CM

## 2017-11-17 DIAGNOSIS — I27.20 MODERATE TO SEVERE PULMONARY HYPERTENSION (HCC): ICD-10-CM

## 2017-11-17 DIAGNOSIS — I15.0 RENOVASCULAR HYPERTENSION: ICD-10-CM

## 2017-11-17 DIAGNOSIS — I48.0 PAROXYSMAL ATRIAL FIBRILLATION (HCC): ICD-10-CM

## 2017-11-17 PROCEDURE — 93000 ELECTROCARDIOGRAM COMPLETE: CPT | Performed by: NURSE PRACTITIONER

## 2017-11-17 PROCEDURE — 99215 OFFICE O/P EST HI 40 MIN: CPT | Performed by: NURSE PRACTITIONER

## 2017-11-17 RX ORDER — SODIUM CHLORIDE 0.9 % (FLUSH) 0.9 %
1-10 SYRINGE (ML) INJECTION AS NEEDED
Status: CANCELLED | OUTPATIENT
Start: 2017-11-17

## 2017-11-17 NOTE — PROGRESS NOTES
"    Subjective:     Encounter Date:11/17/2017      Patient ID: Yovani Butler is a 77 y.o. male.    Chief Complaint:  Shortness of Breath   This is a recurrent problem. The current episode started more than 1 month ago. The problem occurs daily. The problem has been gradually worsening. Associated symptoms include chest pain, leg swelling (occasional) and orthopnea. Pertinent negatives include no abdominal pain, fever, headaches, hemoptysis, PND, rash, syncope or vomiting. The symptoms are aggravated by any activity and exercise.   Chest Pain    This is a recurrent problem. The current episode started 1 to 4 weeks ago. The onset quality is gradual. The problem occurs intermittently. Associated symptoms include malaise/fatigue, orthopnea, shortness of breath and weakness. Pertinent negatives include no abdominal pain, back pain, cough, dizziness, fever, headaches, hemoptysis, nausea, near-syncope, palpitations, PND, syncope or vomiting.     Patient is here for 3 month follow up for chest pain and shortness of breath. Patient had a stress test which showed low risk for ischemia. Patient also had a 2D echo which showed EF 60%, LA moderately dilated, mild Mr, mild-mod MR and severe pulmonary hypertension. Patient has a history of single vessel bypass and aortic valve biprosthetic valve with Dr. Erazo in 2009, atrial fibrillation with chronic anticoagulation with coumadin, carotid disease, sleep apnea, CKD, PVD, GERD, HTN & HLD. Patient was referred to Pulmonary for shortness of breath for which they did PFTs and repeat CT of the chest for a known lung mass. He was started on a inhaler but ultimately was referred back to us because they feel like his shortness of breath is related to his valve. Patient presents today stating \"I am ready to have surgery on my valve.\" In review patient had a heart cath in 2015 which showed severe mitral stenosis with severe pulmonary hypertension. At that time surgery was not pursued, but it " "seems like it was an option. Patient states he has not followed with Dr. Erazo in over a year. Patient states his shortness of breath is continuing to get worse and he \"cannot take it anymore.\"     The following portions of the patient's history were reviewed and updated as appropriate: allergies, current medications, past family history, past medical history, past social history, past surgical history and problem list.   Prior to Admission medications    Medication Sig Start Date End Date Taking? Authorizing Provider   aspirin 81 MG tablet Take 1 tablet by mouth Daily. 8/1/17  Yes Carol Ross PA-C   atorvastatin (LIPITOR) 20 MG tablet Take 20 mg by mouth Daily.   Yes Historical Provider, MD   digoxin (LANOXIN) 125 MCG tablet Take  by mouth every day.   Yes Historical Provider, MD   famotidine (PEPCID) 20 MG tablet Take  by mouth As Needed.   Yes Historical Provider, MD   furosemide (LASIX) 80 MG tablet Take 80 mg by mouth 2 (Two) Times a Day.   Yes Historical Provider, MD   gabapentin (NEURONTIN) 100 MG capsule Take 100 mg by mouth 3 (Three) Times a Day.   Yes Historical Provider, MD   glipiZIDE (GLUCOTROL) 10 MG tablet Take 10 mg by mouth 2 (Two) Times a Day Before Meals.   Yes Historical Provider, MD   LEVOTHYROXINE SODIUM PO Take  by mouth daily.   Yes Historical Provider, MD   metoprolol tartrate (LOPRESSOR) 100 MG tablet Take 100 mg by mouth 2 (Two) Times a Day.   Yes Historical Provider, MD   nitroglycerin (NITROSTAT) 0.4 MG SL tablet 1 under the tongue as needed for angina, may repeat q5mins for up three doses 8/1/17  Yes Carol Ross PA-C   tamsulosin (FLOMAX) 0.4 MG capsule 24 hr capsule Take 1 capsule by mouth Every Night.   Yes Historical Provider, MD   Unable to find Med Name: oxygen at night   Yes Historical Provider, MD   warfarin (COUMADIN) 5 MG tablet Take 5 mg by mouth Daily. MWF- 7mg  Dr. Joyce regulates   Yes Historical Provider, MD   clopidogrel (PLAVIX) 75 MG tablet Take 75 mg by mouth " Daily.  11/17/17 Yes Historical Provider, MD     Past Medical History:   Diagnosis Date   • Abnormal glucose     nec   • AF (atrial fibrillation)    • Aortic stenosis    • Aortic valve replaced    • Arthritis    • CAD in native artery    • Chest pain    • Diabetes mellitus type 2, uncomplicated    • Disease of thyroid gland    • Esophageal reflux    • Exposure to asbestos     hx personal exposure to asbestos   • Hyperlipidemia    • Hypertension    • Mitral stenosis    • Murmur    • Obesity     nos   • Renal artery stenosis    • Sleep apnea, obstructive     see sleep study 7/12 - intolerant to multiple masks/Bipap   • SOB (shortness of breath)        Review of Systems   Constitution: Positive for weakness and malaise/fatigue. Negative for chills, decreased appetite, fever, weight gain and weight loss.   HENT: Negative for nosebleeds.    Eyes: Negative for visual disturbance.   Cardiovascular: Positive for chest pain, dyspnea on exertion, leg swelling (occasional) and orthopnea. Negative for near-syncope, palpitations, paroxysmal nocturnal dyspnea and syncope.   Respiratory: Positive for shortness of breath. Negative for cough, hemoptysis and snoring.    Endocrine: Negative for cold intolerance and heat intolerance.   Hematologic/Lymphatic: Negative for bleeding problem. Does not bruise/bleed easily.   Skin: Negative for rash.   Musculoskeletal: Negative for back pain and falls.   Gastrointestinal: Negative for abdominal pain, constipation, diarrhea, heartburn, melena, nausea and vomiting.   Genitourinary: Negative for hematuria.   Neurological: Negative for dizziness, headaches and light-headedness.   Psychiatric/Behavioral: Negative for altered mental status.   Allergic/Immunologic: Negative for persistent infections.         ECG 12 Lead  Date/Time: 11/17/2017 4:33 PM  Performed by: KAREN OCASIO  Authorized by: KAREN OCASIO   Comparison: compared with previous ECG from 8/1/2017  Similar to previous  "ECG  Rhythm: sinus rhythm and atrial fibrillation  Rate: bradycardic  QRS axis: normal                 Objective:     Physical Exam   Constitutional: He is oriented to person, place, and time. He appears well-developed and well-nourished.   HENT:   Head: Normocephalic and atraumatic.   Eyes: Pupils are equal, round, and reactive to light.   Neck: Normal range of motion. Neck supple. No JVD present. Carotid bruit is not present.   Cardiovascular: Normal rate, regular rhythm and intact distal pulses.    Murmur heard.  Trace lower leg edema   Pulmonary/Chest: Effort normal. He has decreased breath sounds.   Abdominal: Soft. Bowel sounds are normal.   Musculoskeletal: Normal range of motion.   Neurological: He is alert and oriented to person, place, and time. He has normal reflexes.   Skin: Skin is warm and dry.   Psychiatric: He has a normal mood and affect. His behavior is normal. Judgment and thought content normal.     Blood pressure 140/78, pulse 54, height 69\" (175.3 cm), weight 232 lb (105 kg).      Lab Review:       Assessment:          Diagnosis Plan   1. Shortness of breath  Case Request Cath Lab: Left Heart Cath, Right Heart Cath   2. Non-rheumatic mitral valve stenosis  Case Request Cath Lab: Left Heart Cath, Right Heart Cath   3. H/O aortic valve replacement     4. Coronary artery disease involving coronary bypass graft of native heart without angina pectoris     5. Paroxysmal atrial fibrillation     6. Renovascular hypertension     7. Pulmonary hypertension, moderate to severe            Plan:       1. Shortness of Breath worsening. Discussed with Dr. Nguyen will schedule patient for a right and left heart cath. Most likely patient will need appointment with Dr. Erazo, will wait for results of heart cath. Appears euvolemic. On Lasix. Reviiewed CT chest from [place] 9/6/17.   2. Will order heart cath to evaluate mitral valve as was mild-mod stenosis on echo. Similar in 2015 underestimated on echo when cath " was severe.   3. History of aortic bioprosthetic valve in 2009  4. History of CABG and stents. Ongoing chest pain. Negative Stress. Ordering heart cath.  5. Paroxysmal Atrial Fibrillation. Rate controlled. Anticoagulated with Coumadin  6. Hypertension- controlled.  7. Severe pulmonary hypertension    Extensive amount of time spent with this patient. Reviewed with Dr. Nguyen.

## 2017-11-20 PROBLEM — R06.02 SHORTNESS OF BREATH: Status: ACTIVE | Noted: 2017-11-20

## 2017-11-20 PROBLEM — I27.20 MODERATE TO SEVERE PULMONARY HYPERTENSION (HCC): Status: ACTIVE | Noted: 2017-11-20

## 2017-11-20 PROBLEM — I05.0 SEVERE MITRAL VALVE STENOSIS: Status: ACTIVE | Noted: 2017-11-20

## 2017-11-20 PROBLEM — R07.2 PRECORDIAL PAIN: Status: ACTIVE | Noted: 2017-11-20

## 2017-11-29 ENCOUNTER — HOSPITAL ENCOUNTER (OUTPATIENT)
Facility: HOSPITAL | Age: 77
Discharge: HOME | End: 2017-11-29
Attending: INTERNAL MEDICINE | Admitting: INTERNAL MEDICINE

## 2017-11-29 VITALS
OXYGEN SATURATION: 97 % | HEIGHT: 69 IN | TEMPERATURE: 98.3 F | BODY MASS INDEX: 34.36 KG/M2 | DIASTOLIC BLOOD PRESSURE: 63 MMHG | RESPIRATION RATE: 18 BRPM | HEART RATE: 85 BPM | WEIGHT: 232 LBS | SYSTOLIC BLOOD PRESSURE: 112 MMHG

## 2017-11-29 DIAGNOSIS — R06.02 SHORTNESS OF BREATH: ICD-10-CM

## 2017-11-29 DIAGNOSIS — I25.810 CORONARY ARTERY DISEASE INVOLVING CORONARY BYPASS GRAFT OF NATIVE HEART WITHOUT ANGINA PECTORIS: ICD-10-CM

## 2017-11-29 DIAGNOSIS — I05.0 SEVERE MITRAL VALVE STENOSIS: ICD-10-CM

## 2017-11-29 DIAGNOSIS — I27.20 MODERATE TO SEVERE PULMONARY HYPERTENSION (HCC): ICD-10-CM

## 2017-11-29 DIAGNOSIS — R07.2 PRECORDIAL PAIN: ICD-10-CM

## 2017-11-29 LAB
ANION GAP SERPL CALCULATED.3IONS-SCNC: 12 MMOL/L (ref 4–13)
ARTERIAL PATENCY WRIST A: ABNORMAL
ARTERIAL PATENCY WRIST A: ABNORMAL
ARTERIAL PATENCY WRIST A: NORMAL
ARTERIAL PATENCY WRIST A: NORMAL
ATMOSPHERIC PRESS: 758 MMHG
ATMOSPHERIC PRESS: NORMAL MMHG
ATMOSPHERIC PRESS: NORMAL MMHG
BASE EXCESS BLDA CALC-SCNC: 1.6 MMOL/L (ref 0–2)
BASE EXCESS BLDA CALC-SCNC: 2.7 MMOL/L (ref 0–2)
BASE EXCESS BLDA CALC-SCNC: NORMAL MMOL/L (ref 0–2)
BASE EXCESS BLDA CALC-SCNC: NORMAL MMOL/L (ref 0–2)
BASE EXCESS BLDV CALC-SCNC: 4.1 MMOL/L (ref 0–2)
BASE EXCESS BLDV CALC-SCNC: 4.2 MMOL/L (ref 0–2)
BASOPHILS # BLD AUTO: 0.08 10*3/MM3 (ref 0–0.2)
BASOPHILS NFR BLD AUTO: 0.7 % (ref 0–2)
BDY SITE: ABNORMAL
BDY SITE: NORMAL
BDY SITE: NORMAL
BODY TEMPERATURE: 37 C
BODY TEMPERATURE: 37 C
BODY TEMPERATURE: NORMAL C
BODY TEMPERATURE: NORMAL C
BUN BLD-MCNC: 20 MG/DL (ref 5–21)
BUN/CREAT SERPL: 16 (ref 7–25)
CALCIUM SPEC-SCNC: 9.4 MG/DL (ref 8.4–10.4)
CHLORIDE SERPL-SCNC: 99 MMOL/L (ref 98–110)
CO2 SERPL-SCNC: 28 MMOL/L (ref 24–31)
COHGB MFR BLD: 1.7 % (ref 0–5)
COHGB MFR BLD: NORMAL % (ref 0–5)
COHGB MFR BLD: NORMAL % (ref 0–5)
CREAT BLD-MCNC: 1.25 MG/DL (ref 0.5–1.4)
DEPRECATED RDW RBC AUTO: 49.1 FL (ref 40–54)
EOSINOPHIL # BLD AUTO: 0.46 10*3/MM3 (ref 0–0.7)
EOSINOPHIL NFR BLD AUTO: 4.2 % (ref 0–4)
ERYTHROCYTE [DISTWIDTH] IN BLOOD BY AUTOMATED COUNT: 14.8 % (ref 12–15)
GFR SERPL CREATININE-BSD FRML MDRD: 56 ML/MIN/1.73
GLUCOSE BLD-MCNC: 157 MG/DL (ref 70–100)
HCO3 BLDA-SCNC: 25.2 MMOL/L (ref 20–26)
HCO3 BLDA-SCNC: 26.1 MMOL/L (ref 20–26)
HCO3 BLDA-SCNC: NORMAL MMOL/L (ref 20–26)
HCO3 BLDA-SCNC: NORMAL MMOL/L (ref 20–26)
HCO3 BLDV-SCNC: 28.7 MMOL/L (ref 22–28)
HCO3 BLDV-SCNC: 28.8 MMOL/L (ref 22–28)
HCT VFR BLD AUTO: 37.1 % (ref 40–52)
HCT VFR BLD CALC: 40.3 % (ref 38–51)
HCT VFR BLD CALC: 41.4 % (ref 38–51)
HCT VFR BLD CALC: NORMAL % (ref 38–51)
HCT VFR BLD CALC: NORMAL % (ref 38–51)
HGB BLD-MCNC: 12.4 G/DL (ref 14–18)
HGB BLDA-MCNC: 12.6 G/DL (ref 14–18)
HGB BLDA-MCNC: 12.7 G/DL (ref 14–18)
HGB BLDA-MCNC: 13.1 G/DL (ref 14–18)
HGB BLDA-MCNC: 13.5 G/DL (ref 14–18)
HGB BLDA-MCNC: NORMAL G/DL (ref 14–18)
HGB BLDA-MCNC: NORMAL G/DL (ref 14–18)
HOROWITZ INDEX BLD+IHG-RTO: 21 %
HOROWITZ INDEX BLD+IHG-RTO: 21 %
IMM GRANULOCYTES # BLD: 0.05 10*3/MM3 (ref 0–0.03)
IMM GRANULOCYTES NFR BLD: 0.5 % (ref 0–5)
INR PPP: 1.16 (ref 0.91–1.09)
LYMPHOCYTES # BLD AUTO: 1.75 10*3/MM3 (ref 0.72–4.86)
LYMPHOCYTES NFR BLD AUTO: 16 % (ref 15–45)
Lab: 1029
Lab: 1037
Lab: ABNORMAL
Lab: NORMAL
MCH RBC QN AUTO: 30.5 PG (ref 28–32)
MCHC RBC AUTO-ENTMCNC: 33.4 G/DL (ref 33–36)
MCV RBC AUTO: 91.4 FL (ref 82–95)
METHGB BLD QL: 0.6 % (ref 0–3)
METHGB BLD QL: 0.6 % (ref 0–3)
METHGB BLD QL: 0.7 % (ref 0–3)
METHGB BLD QL: 0.8 % (ref 0–3)
METHGB BLD QL: NORMAL % (ref 0–3)
METHGB BLD QL: NORMAL % (ref 0–3)
MODALITY: ABNORMAL
MODALITY: NORMAL
MODALITY: NORMAL
MONOCYTES # BLD AUTO: 0.73 10*3/MM3 (ref 0.19–1.3)
MONOCYTES NFR BLD AUTO: 6.7 % (ref 4–12)
NEUTROPHILS # BLD AUTO: 7.9 10*3/MM3 (ref 1.87–8.4)
NEUTROPHILS NFR BLD AUTO: 71.9 % (ref 39–78)
NOTIFIED BY: ABNORMAL
NOTIFIED BY: ABNORMAL
NOTIFIED BY: NORMAL
NOTIFIED BY: NORMAL
NOTIFIED WHO: ABNORMAL
NOTIFIED WHO: ABNORMAL
NOTIFIED WHO: NORMAL
NOTIFIED WHO: NORMAL
NRBC BLD MANUAL-RTO: 0 /100 WBC (ref 0–0)
OXYHGB MFR BLDV: 52.2 % (ref 45–75)
OXYHGB MFR BLDV: 56.5 % (ref 45–75)
OXYHGB MFR BLDV: 88.5 % (ref 94–99)
OXYHGB MFR BLDV: 88.9 % (ref 94–99)
OXYHGB MFR BLDV: NORMAL % (ref 94–99)
OXYHGB MFR BLDV: NORMAL % (ref 94–99)
PCO2 BLDA: 35.5 MM HG (ref 35–45)
PCO2 BLDA: 35.8 MM HG (ref 35–45)
PCO2 BLDA: NORMAL MM HG (ref 35–45)
PCO2 BLDA: NORMAL MM HG (ref 35–45)
PCO2 BLDV: 41.6 MM HG (ref 41–51)
PCO2 BLDV: 42.6 MM HG (ref 41–51)
PH BLDA: 7.46 PH UNITS (ref 7.35–7.45)
PH BLDA: 7.47 PH UNITS (ref 7.35–7.45)
PH BLDA: NORMAL PH UNITS (ref 7.35–7.45)
PH BLDA: NORMAL PH UNITS (ref 7.35–7.45)
PH BLDV: 7.44 PH UNITS (ref 7.32–7.42)
PH BLDV: 7.45 PH UNITS (ref 7.32–7.42)
PLATELET # BLD AUTO: 228 10*3/MM3 (ref 130–400)
PMV BLD AUTO: 9.4 FL (ref 6–12)
PO2 BLDA: 57.5 MM HG (ref 83–108)
PO2 BLDA: 58.2 MM HG (ref 83–108)
PO2 BLDA: NORMAL MM HG (ref 83–108)
PO2 BLDA: NORMAL MM HG (ref 83–108)
PO2 BLDV: 29 MM HG (ref 27–53)
PO2 BLDV: 31.3 MM HG (ref 27–53)
POTASSIUM BLD-SCNC: 3.2 MMOL/L (ref 3.5–5.3)
POTASSIUM BLDA-SCNC: 3.2 MMOL/L (ref 3.5–5.2)
POTASSIUM BLDA-SCNC: 3.3 MMOL/L (ref 3.5–5.2)
POTASSIUM BLDA-SCNC: NORMAL MMOL/L (ref 3.5–5.2)
POTASSIUM BLDA-SCNC: NORMAL MMOL/L (ref 3.5–5.2)
POTASSIUM BLDV-SCNC: 3.3 MMOL/L
PROTHROMBIN TIME: 15.2 SECONDS (ref 11.9–14.6)
RBC # BLD AUTO: 4.06 10*6/MM3 (ref 4.8–5.9)
SAO2 % BLDCOA: 90.6 % (ref 94–99)
SAO2 % BLDCOA: 90.9 % (ref 94–99)
SAO2 % BLDCOA: NORMAL % (ref 94–99)
SAO2 % BLDCOA: NORMAL % (ref 94–99)
SAO2 % BLDCOV: 53.5 % (ref 45–75)
SODIUM BLD-SCNC: 139 MMOL/L (ref 135–145)
SODIUM BLDA-SCNC: 139 MMOL/L (ref 136–145)
SODIUM BLDA-SCNC: 140 MMOL/L (ref 136–145)
SODIUM BLDA-SCNC: NORMAL MMOL/L (ref 136–145)
SODIUM BLDA-SCNC: NORMAL MMOL/L (ref 136–145)
SODIUM BLDV-SCNC: 140 MMOL/L
VENTILATOR MODE: ABNORMAL
VENTILATOR MODE: ABNORMAL
VENTILATOR MODE: NORMAL
VENTILATOR MODE: NORMAL
WBC NRBC COR # BLD: 10.97 10*3/MM3 (ref 4.8–10.8)

## 2017-11-29 PROCEDURE — 25010000002 MIDAZOLAM PER 1 MG: Performed by: INTERNAL MEDICINE

## 2017-11-29 PROCEDURE — 80048 BASIC METABOLIC PNL TOTAL CA: CPT | Performed by: NURSE PRACTITIONER

## 2017-11-29 PROCEDURE — 82820 HEMOGLOBIN-OXYGEN AFFINITY: CPT

## 2017-11-29 PROCEDURE — C1894 INTRO/SHEATH, NON-LASER: HCPCS | Performed by: INTERNAL MEDICINE

## 2017-11-29 PROCEDURE — 99152 MOD SED SAME PHYS/QHP 5/>YRS: CPT | Performed by: INTERNAL MEDICINE

## 2017-11-29 PROCEDURE — 63710000001 ASPIRIN 325 MG TABLET

## 2017-11-29 PROCEDURE — 92937 PRQ TRLUML REVSC CAB GRF 1: CPT | Performed by: INTERNAL MEDICINE

## 2017-11-29 PROCEDURE — 85025 COMPLETE CBC W/AUTO DIFF WBC: CPT | Performed by: NURSE PRACTITIONER

## 2017-11-29 PROCEDURE — 83050 HGB METHEMOGLOBIN QUAN: CPT

## 2017-11-29 PROCEDURE — 25010000002 ENOXAPARIN PER 10 MG: Performed by: INTERNAL MEDICINE

## 2017-11-29 PROCEDURE — C1769 GUIDE WIRE: HCPCS | Performed by: INTERNAL MEDICINE

## 2017-11-29 PROCEDURE — C1760 CLOSURE DEV, VASC: HCPCS | Performed by: INTERNAL MEDICINE

## 2017-11-29 PROCEDURE — C1757 CATH, THROMBECTOMY/EMBOLECT: HCPCS | Performed by: INTERNAL MEDICINE

## 2017-11-29 PROCEDURE — 93457 R HRT ART/GRFT ANGIO: CPT | Performed by: INTERNAL MEDICINE

## 2017-11-29 PROCEDURE — 92920 PRQ TRLUML C ANGIOP 1ART&/BR: CPT | Performed by: INTERNAL MEDICINE

## 2017-11-29 PROCEDURE — 82375 ASSAY CARBOXYHB QUANT: CPT

## 2017-11-29 PROCEDURE — C1725 CATH, TRANSLUMIN NON-LASER: HCPCS | Performed by: INTERNAL MEDICINE

## 2017-11-29 PROCEDURE — 25010000002 DIPHENHYDRAMINE PER 50 MG: Performed by: INTERNAL MEDICINE

## 2017-11-29 PROCEDURE — 82805 BLOOD GASES W/O2 SATURATION: CPT

## 2017-11-29 PROCEDURE — 93461 R&L HRT ART/VENTRICLE ANGIO: CPT | Performed by: INTERNAL MEDICINE

## 2017-11-29 PROCEDURE — 85610 PROTHROMBIN TIME: CPT | Performed by: NURSE PRACTITIONER

## 2017-11-29 PROCEDURE — A9270 NON-COVERED ITEM OR SERVICE: HCPCS

## 2017-11-29 PROCEDURE — C1887 CATHETER, GUIDING: HCPCS | Performed by: INTERNAL MEDICINE

## 2017-11-29 PROCEDURE — 36600 WITHDRAWAL OF ARTERIAL BLOOD: CPT

## 2017-11-29 PROCEDURE — 0 IOPAMIDOL PER 1 ML: Performed by: INTERNAL MEDICINE

## 2017-11-29 RX ORDER — ASPIRIN 325 MG
TABLET ORAL
Status: COMPLETED
Start: 2017-11-29 | End: 2017-11-29

## 2017-11-29 RX ORDER — DIPHENHYDRAMINE HYDROCHLORIDE 50 MG/ML
INJECTION INTRAMUSCULAR; INTRAVENOUS AS NEEDED
Status: DISCONTINUED | OUTPATIENT
Start: 2017-11-29 | End: 2017-11-29 | Stop reason: HOSPADM

## 2017-11-29 RX ORDER — SODIUM CHLORIDE 0.9 % (FLUSH) 0.9 %
1-10 SYRINGE (ML) INJECTION AS NEEDED
Status: DISCONTINUED | OUTPATIENT
Start: 2017-11-29 | End: 2017-11-29 | Stop reason: HOSPADM

## 2017-11-29 RX ORDER — ASPIRIN 325 MG
325 TABLET ORAL ONCE
Status: COMPLETED | OUTPATIENT
Start: 2017-11-29 | End: 2017-11-29

## 2017-11-29 RX ORDER — SODIUM CHLORIDE 9 MG/ML
75 INJECTION, SOLUTION INTRAVENOUS CONTINUOUS
Status: DISCONTINUED | OUTPATIENT
Start: 2017-11-29 | End: 2017-11-29 | Stop reason: HOSPADM

## 2017-11-29 RX ORDER — SODIUM CHLORIDE 9 MG/ML
100 INJECTION, SOLUTION INTRAVENOUS CONTINUOUS
Status: DISPENSED | OUTPATIENT
Start: 2017-11-29 | End: 2017-11-29

## 2017-11-29 RX ORDER — MIDAZOLAM HYDROCHLORIDE 1 MG/ML
INJECTION INTRAMUSCULAR; INTRAVENOUS AS NEEDED
Status: DISCONTINUED | OUTPATIENT
Start: 2017-11-29 | End: 2017-11-29 | Stop reason: HOSPADM

## 2017-11-29 RX ORDER — LIDOCAINE HYDROCHLORIDE 20 MG/ML
INJECTION, SOLUTION INFILTRATION; PERINEURAL AS NEEDED
Status: DISCONTINUED | OUTPATIENT
Start: 2017-11-29 | End: 2017-11-29 | Stop reason: HOSPADM

## 2017-11-29 RX ADMIN — ASPIRIN 325 MG: 325 TABLET, COATED ORAL at 08:05

## 2017-11-29 RX ADMIN — Medication 325 MG: at 08:05

## 2017-11-29 RX ADMIN — SODIUM CHLORIDE 75 ML/HR: 9 INJECTION, SOLUTION INTRAVENOUS at 08:02

## 2018-01-01 ENCOUNTER — OFFICE VISIT (OUTPATIENT)
Dept: UROLOGY | Facility: CLINIC | Age: 78
End: 2018-01-01

## 2018-01-01 ENCOUNTER — HOSPITAL ENCOUNTER (OUTPATIENT)
Dept: CARDIOLOGY | Facility: HOSPITAL | Age: 78
Discharge: HOME | End: 2018-01-09
Attending: INTERNAL MEDICINE | Admitting: INTERNAL MEDICINE

## 2018-01-01 ENCOUNTER — APPOINTMENT (OUTPATIENT)
Dept: CARDIOLOGY | Facility: HOSPITAL | Age: 78
End: 2018-01-01
Attending: INTERNAL MEDICINE

## 2018-01-01 ENCOUNTER — APPOINTMENT (OUTPATIENT)
Dept: GENERAL RADIOLOGY | Facility: HOSPITAL | Age: 78
End: 2018-01-01

## 2018-01-01 ENCOUNTER — OFFICE VISIT (OUTPATIENT)
Dept: CARDIOLOGY | Facility: CLINIC | Age: 78
End: 2018-01-01

## 2018-01-01 ENCOUNTER — ANESTHESIA (OUTPATIENT)
Dept: CARDIOLOGY | Facility: HOSPITAL | Age: 78
End: 2018-01-01

## 2018-01-01 ENCOUNTER — TELEPHONE (OUTPATIENT)
Dept: CARDIAC SURGERY | Facility: CLINIC | Age: 78
End: 2018-01-01

## 2018-01-01 ENCOUNTER — OFFICE VISIT (OUTPATIENT)
Dept: CARDIAC SURGERY | Facility: CLINIC | Age: 78
End: 2018-01-01

## 2018-01-01 ENCOUNTER — HOSPITAL ENCOUNTER (OUTPATIENT)
Facility: HOSPITAL | Age: 78
Discharge: HOME | End: 2018-01-13
Attending: INTERNAL MEDICINE | Admitting: INTERNAL MEDICINE

## 2018-01-01 ENCOUNTER — ANESTHESIA EVENT (OUTPATIENT)
Dept: CARDIOLOGY | Facility: HOSPITAL | Age: 78
End: 2018-01-01

## 2018-01-01 ENCOUNTER — OFFICE VISIT (OUTPATIENT)
Dept: OTOLARYNGOLOGY | Facility: CLINIC | Age: 78
End: 2018-01-01

## 2018-01-01 ENCOUNTER — TELEPHONE (OUTPATIENT)
Dept: CARDIOLOGY | Facility: CLINIC | Age: 78
End: 2018-01-01

## 2018-01-01 ENCOUNTER — HOSPITAL ENCOUNTER (OUTPATIENT)
Dept: CARDIOLOGY | Facility: HOSPITAL | Age: 78
Discharge: HOME | End: 2018-01-08
Admitting: INTERNAL MEDICINE

## 2018-01-01 VITALS — WEIGHT: 223 LBS | BODY MASS INDEX: 32.93 KG/M2

## 2018-01-01 VITALS
TEMPERATURE: 96.6 F | WEIGHT: 223.8 LBS | SYSTOLIC BLOOD PRESSURE: 124 MMHG | DIASTOLIC BLOOD PRESSURE: 68 MMHG | HEIGHT: 69 IN | BODY MASS INDEX: 33.15 KG/M2

## 2018-01-01 VITALS
HEIGHT: 69 IN | HEART RATE: 61 BPM | SYSTOLIC BLOOD PRESSURE: 118 MMHG | WEIGHT: 226 LBS | BODY MASS INDEX: 33.47 KG/M2 | DIASTOLIC BLOOD PRESSURE: 80 MMHG

## 2018-01-01 VITALS
HEIGHT: 69 IN | BODY MASS INDEX: 33.03 KG/M2 | OXYGEN SATURATION: 96 % | SYSTOLIC BLOOD PRESSURE: 124 MMHG | WEIGHT: 223 LBS | HEART RATE: 57 BPM | WEIGHT: 216.8 LBS | SYSTOLIC BLOOD PRESSURE: 110 MMHG | HEART RATE: 74 BPM | DIASTOLIC BLOOD PRESSURE: 86 MMHG | HEIGHT: 69 IN | DIASTOLIC BLOOD PRESSURE: 70 MMHG | BODY MASS INDEX: 32.11 KG/M2

## 2018-01-01 VITALS
TEMPERATURE: 97.4 F | OXYGEN SATURATION: 99 % | DIASTOLIC BLOOD PRESSURE: 74 MMHG | WEIGHT: 224 LBS | BODY MASS INDEX: 33.95 KG/M2 | SYSTOLIC BLOOD PRESSURE: 138 MMHG | HEIGHT: 68 IN | HEART RATE: 92 BPM | RESPIRATION RATE: 20 BRPM

## 2018-01-01 VITALS
SYSTOLIC BLOOD PRESSURE: 116 MMHG | OXYGEN SATURATION: 94 % | HEIGHT: 68 IN | BODY MASS INDEX: 33.8 KG/M2 | HEART RATE: 69 BPM | DIASTOLIC BLOOD PRESSURE: 61 MMHG | TEMPERATURE: 97.5 F | WEIGHT: 223 LBS | RESPIRATION RATE: 18 BRPM

## 2018-01-01 VITALS
WEIGHT: 223 LBS | DIASTOLIC BLOOD PRESSURE: 81 MMHG | SYSTOLIC BLOOD PRESSURE: 138 MMHG | HEART RATE: 64 BPM | HEIGHT: 69 IN | BODY MASS INDEX: 33.03 KG/M2

## 2018-01-01 VITALS — BODY MASS INDEX: 32.11 KG/M2 | WEIGHT: 216.8 LBS | HEIGHT: 69 IN | TEMPERATURE: 97.6 F

## 2018-01-01 VITALS
TEMPERATURE: 98 F | HEIGHT: 69 IN | HEART RATE: 67 BPM | SYSTOLIC BLOOD PRESSURE: 135 MMHG | RESPIRATION RATE: 20 BRPM | WEIGHT: 220 LBS | DIASTOLIC BLOOD PRESSURE: 80 MMHG | BODY MASS INDEX: 32.58 KG/M2

## 2018-01-01 DIAGNOSIS — I27.22 PULMONARY HYPERTENSION DUE TO LEFT HEART VALVULAR DISEASE (HCC): ICD-10-CM

## 2018-01-01 DIAGNOSIS — I27.20 MODERATE TO SEVERE PULMONARY HYPERTENSION (HCC): ICD-10-CM

## 2018-01-01 DIAGNOSIS — R06.02 SHORTNESS OF BREATH: ICD-10-CM

## 2018-01-01 DIAGNOSIS — I05.0 MITRAL VALVE STENOSIS, SEVERE: Primary | ICD-10-CM

## 2018-01-01 DIAGNOSIS — I25.810 CORONARY ARTERY DISEASE INVOLVING CORONARY BYPASS GRAFT OF NATIVE HEART WITHOUT ANGINA PECTORIS: ICD-10-CM

## 2018-01-01 DIAGNOSIS — I38 PULMONARY HYPERTENSION DUE TO LEFT HEART VALVULAR DISEASE (HCC): ICD-10-CM

## 2018-01-01 DIAGNOSIS — I48.0 PAROXYSMAL ATRIAL FIBRILLATION (HCC): ICD-10-CM

## 2018-01-01 DIAGNOSIS — Z95.1 HX OF CABG: ICD-10-CM

## 2018-01-01 DIAGNOSIS — I05.0 MITRAL VALVE STENOSIS, SEVERE: ICD-10-CM

## 2018-01-01 DIAGNOSIS — I05.0 SEVERE MITRAL VALVE STENOSIS: ICD-10-CM

## 2018-01-01 DIAGNOSIS — N40.0 BENIGN PROSTATIC HYPERPLASIA WITHOUT LOWER URINARY TRACT SYMPTOMS: ICD-10-CM

## 2018-01-01 DIAGNOSIS — I05.0 RHEUMATIC MITRAL STENOSIS: Primary | ICD-10-CM

## 2018-01-01 DIAGNOSIS — I25.810 CORONARY ARTERY DISEASE INVOLVING CORONARY BYPASS GRAFT OF NATIVE HEART WITHOUT ANGINA PECTORIS: Primary | ICD-10-CM

## 2018-01-01 DIAGNOSIS — R39.12 BENIGN PROSTATIC HYPERPLASIA WITH WEAK URINARY STREAM: ICD-10-CM

## 2018-01-01 DIAGNOSIS — R30.0 DYSURIA: Primary | ICD-10-CM

## 2018-01-01 DIAGNOSIS — R30.0 DYSURIA: ICD-10-CM

## 2018-01-01 DIAGNOSIS — Z95.2 H/O AORTIC VALVE REPLACEMENT: ICD-10-CM

## 2018-01-01 DIAGNOSIS — I10 ESSENTIAL HYPERTENSION: ICD-10-CM

## 2018-01-01 DIAGNOSIS — E78.2 MIXED HYPERLIPIDEMIA: ICD-10-CM

## 2018-01-01 DIAGNOSIS — I48.20 CHRONIC ATRIAL FIBRILLATION (HCC): ICD-10-CM

## 2018-01-01 DIAGNOSIS — E66.09 CLASS 1 OBESITY DUE TO EXCESS CALORIES WITH SERIOUS COMORBIDITY AND BODY MASS INDEX (BMI) OF 33.0 TO 33.9 IN ADULT: ICD-10-CM

## 2018-01-01 DIAGNOSIS — Z79.01 CHRONIC ANTICOAGULATION: ICD-10-CM

## 2018-01-01 DIAGNOSIS — I27.20 PULMONARY HYPERTENSION, MODERATE TO SEVERE (HCC): ICD-10-CM

## 2018-01-01 DIAGNOSIS — I05.0 MITRAL VALVE STENOSIS, UNSPECIFIED ETIOLOGY: ICD-10-CM

## 2018-01-01 DIAGNOSIS — I05.0 SEVERE MITRAL VALVE STENOSIS: Primary | ICD-10-CM

## 2018-01-01 DIAGNOSIS — I05.0 RHEUMATIC MITRAL STENOSIS: ICD-10-CM

## 2018-01-01 DIAGNOSIS — N40.1 BENIGN PROSTATIC HYPERPLASIA WITH WEAK URINARY STREAM: ICD-10-CM

## 2018-01-01 DIAGNOSIS — L57.0 ACTINIC KERATOSIS: Primary | ICD-10-CM

## 2018-01-01 LAB
ACT BLD: 197 SECONDS (ref 82–152)
ACT BLD: 246 SECONDS (ref 82–152)
ACT BLD: 279 SECONDS (ref 82–152)
ALBUMIN SERPL-MCNC: 4.1 G/DL (ref 3.5–5)
ALBUMIN/GLOB SERPL: 1 G/DL (ref 1.1–2.5)
ALP SERPL-CCNC: 89 U/L (ref 24–120)
ALT SERPL W P-5'-P-CCNC: 43 U/L (ref 0–54)
ANION GAP SERPL CALCULATED.3IONS-SCNC: 11 MMOL/L (ref 4–13)
ANION GAP SERPL CALCULATED.3IONS-SCNC: 12 MMOL/L (ref 4–13)
AST SERPL-CCNC: 41 U/L (ref 7–45)
BACTERIA SPEC AEROBE CULT: NORMAL
BH CV ECHO MEAS - AO MAX PG: 7.8 MMHG
BH CV ECHO MEAS - AO V2 MAX: 140 CM/SEC
BH CV ECHO MEAS - BSA(HAYCOCK): 2.2 M^2
BH CV ECHO MEAS - BSA: 2.1 M^2
BH CV ECHO MEAS - BZI_BMI: 33.6 KILOGRAMS/M^2
BH CV ECHO MEAS - BZI_BMI: 33.9 KILOGRAMS/M^2
BH CV ECHO MEAS - BZI_BMI: 34.1 KILOGRAMS/M^2
BH CV ECHO MEAS - BZI_METRIC_HEIGHT: 172.7 CM
BH CV ECHO MEAS - BZI_METRIC_WEIGHT: 100.2 KG
BH CV ECHO MEAS - BZI_METRIC_WEIGHT: 101.2 KG
BH CV ECHO MEAS - BZI_METRIC_WEIGHT: 101.6 KG
BH CV ECHO MEAS - CONTRAST EF 4CH: 57 ML/M^2
BH CV ECHO MEAS - EDV(MOD-SP4): 50.5 ML
BH CV ECHO MEAS - ESV(MOD-SP4): 21.7 ML
BH CV ECHO MEAS - LV DIASTOLIC VOL/BSA (35-75): 23.6 ML/M^2
BH CV ECHO MEAS - LV SYSTOLIC VOL/BSA (12-30): 10.1 ML/M^2
BH CV ECHO MEAS - LVLD AP4: 6.6 CM
BH CV ECHO MEAS - LVLS AP4: 6.3 CM
BH CV ECHO MEAS - MR MAX PG: 96.4 MMHG
BH CV ECHO MEAS - MR MAX VEL: 491 CM/SEC
BH CV ECHO MEAS - MV DEC SLOPE: 249 CM/SEC^2
BH CV ECHO MEAS - MV DEC SLOPE: 573 CM/SEC^2
BH CV ECHO MEAS - MV DEC TIME: 0.47 SEC
BH CV ECHO MEAS - MV E MAX VEL: 236 CM/SEC
BH CV ECHO MEAS - MV MAX PG: 28.7 MMHG
BH CV ECHO MEAS - MV MAX PG: 6.1 MMHG
BH CV ECHO MEAS - MV MEAN PG: 3 MMHG
BH CV ECHO MEAS - MV MEAN PG: 9.7 MMHG
BH CV ECHO MEAS - MV P1/2T MAX VEL: 168 CM/SEC
BH CV ECHO MEAS - MV P1/2T MAX VEL: 260 CM/SEC
BH CV ECHO MEAS - MV P1/2T: 132.9 MSEC
BH CV ECHO MEAS - MV P1/2T: 197.6 MSEC
BH CV ECHO MEAS - MV V2 MAX: 123 CM/SEC
BH CV ECHO MEAS - MV V2 MAX: 267.3 CM/SEC
BH CV ECHO MEAS - MV V2 MEAN: 134 CM/SEC
BH CV ECHO MEAS - MV V2 MEAN: 84.8 CM/SEC
BH CV ECHO MEAS - MV V2 VTI: 43.2 CM
BH CV ECHO MEAS - MV V2 VTI: 80 CM
BH CV ECHO MEAS - MVA P1/2T LCG: 0.85 CM^2
BH CV ECHO MEAS - MVA P1/2T LCG: 1.3 CM^2
BH CV ECHO MEAS - MVA(P1/2T): 1.1 CM^2
BH CV ECHO MEAS - MVA(P1/2T): 1.7 CM^2
BH CV ECHO MEAS - RAP SYSTOLE: 10 MMHG
BH CV ECHO MEAS - RVSP: 66.6 MMHG
BH CV ECHO MEAS - SI(MOD-SP4): 13.5 ML/M^2
BH CV ECHO MEAS - SV(MOD-SP4): 28.8 ML
BH CV ECHO MEAS - TR MAX VEL: 376 CM/SEC
BILIRUB BLD-MCNC: NEGATIVE MG/DL
BILIRUB SERPL-MCNC: 1.1 MG/DL (ref 0.1–1)
BUN BLD-MCNC: 15 MG/DL (ref 5–21)
BUN BLD-MCNC: 17 MG/DL (ref 5–21)
BUN/CREAT SERPL: 12.1 (ref 7–25)
BUN/CREAT SERPL: 13.4 (ref 7–25)
CALCIUM SPEC-SCNC: 8.6 MG/DL (ref 8.4–10.4)
CALCIUM SPEC-SCNC: 9.3 MG/DL (ref 8.4–10.4)
CHLORIDE SERPL-SCNC: 97 MMOL/L (ref 98–110)
CHLORIDE SERPL-SCNC: 97 MMOL/L (ref 98–110)
CLARITY, POC: CLEAR
CO2 SERPL-SCNC: 29 MMOL/L (ref 24–31)
CO2 SERPL-SCNC: 31 MMOL/L (ref 24–31)
COLOR UR: YELLOW
CREAT BLD-MCNC: 1.24 MG/DL (ref 0.5–1.4)
CREAT BLD-MCNC: 1.27 MG/DL (ref 0.5–1.4)
DEPRECATED RDW RBC AUTO: 47.2 FL (ref 40–54)
DEPRECATED RDW RBC AUTO: 48.6 FL (ref 40–54)
ERYTHROCYTE [DISTWIDTH] IN BLOOD BY AUTOMATED COUNT: 14.4 % (ref 12–15)
ERYTHROCYTE [DISTWIDTH] IN BLOOD BY AUTOMATED COUNT: 14.6 % (ref 12–15)
GFR SERPL CREATININE-BSD FRML MDRD: 55 ML/MIN/1.73
GFR SERPL CREATININE-BSD FRML MDRD: 57 ML/MIN/1.73
GLOBULIN UR ELPH-MCNC: 4.2 GM/DL
GLUCOSE BLD-MCNC: 163 MG/DL (ref 70–100)
GLUCOSE BLD-MCNC: 92 MG/DL (ref 70–100)
GLUCOSE BLDC GLUCOMTR-MCNC: 153 MG/DL (ref 70–130)
GLUCOSE UR STRIP-MCNC: NEGATIVE MG/DL
HCT VFR BLD AUTO: 32 % (ref 40–52)
HCT VFR BLD AUTO: 39.4 % (ref 40–52)
HGB BLD-MCNC: 10.7 G/DL (ref 14–18)
HGB BLD-MCNC: 13.1 G/DL (ref 14–18)
INR PPP: 1.21 (ref 0.91–1.09)
INR PPP: 1.28 (ref 0.91–1.09)
INR PPP: 1.3 (ref 0.91–1.09)
INR PPP: 1.59 (ref 0.91–1.09)
KETONES UR QL: NEGATIVE
LEUKOCYTE EST, POC: NEGATIVE
MAXIMAL PREDICTED HEART RATE: 144 BPM
MCH RBC QN AUTO: 29.9 PG (ref 28–32)
MCH RBC QN AUTO: 30.3 PG (ref 28–32)
MCHC RBC AUTO-ENTMCNC: 33.2 G/DL (ref 33–36)
MCHC RBC AUTO-ENTMCNC: 33.4 G/DL (ref 33–36)
MCV RBC AUTO: 89.4 FL (ref 82–95)
MCV RBC AUTO: 91.2 FL (ref 82–95)
NITRITE UR-MCNC: NEGATIVE MG/ML
PH UR: 6 [PH] (ref 5–8)
PLATELET # BLD AUTO: 243 10*3/MM3 (ref 130–400)
PLATELET # BLD AUTO: 286 10*3/MM3 (ref 130–400)
PMV BLD AUTO: 9.2 FL (ref 6–12)
PMV BLD AUTO: 9.7 FL (ref 6–12)
POTASSIUM BLD-SCNC: 3.6 MMOL/L (ref 3.5–5.3)
POTASSIUM BLD-SCNC: 4 MMOL/L (ref 3.5–5.3)
PROT SERPL-MCNC: 8.3 G/DL (ref 6.3–8.7)
PROT UR STRIP-MCNC: ABNORMAL MG/DL
PROTHROMBIN TIME: 15 SECONDS (ref 10–13.8)
PROTHROMBIN TIME: 15.7 SECONDS (ref 11.9–14.6)
PROTHROMBIN TIME: 16.4 SECONDS (ref 11.9–14.6)
PROTHROMBIN TIME: 19.5 SECONDS (ref 11.9–14.6)
RBC # BLD AUTO: 3.58 10*6/MM3 (ref 4.8–5.9)
RBC # BLD AUTO: 4.32 10*6/MM3 (ref 4.8–5.9)
RBC # UR STRIP: ABNORMAL /UL
SODIUM BLD-SCNC: 137 MMOL/L (ref 135–145)
SODIUM BLD-SCNC: 140 MMOL/L (ref 135–145)
SP GR UR: 1.01 (ref 1–1.03)
STRESS TARGET HR: 122 BPM
TV MEAN GRADIENT: 9 MMHG
UROBILINOGEN UR QL: ABNORMAL
WBC NRBC COR # BLD: 10.58 10*3/MM3 (ref 4.8–10.8)
WBC NRBC COR # BLD: 7.71 10*3/MM3 (ref 4.8–10.8)

## 2018-01-01 PROCEDURE — 99214 OFFICE O/P EST MOD 30 MIN: CPT | Performed by: THORACIC SURGERY (CARDIOTHORACIC VASCULAR SURGERY)

## 2018-01-01 PROCEDURE — 81001 URINALYSIS AUTO W/SCOPE: CPT | Performed by: UROLOGY

## 2018-01-01 PROCEDURE — 63710000001 LEVOTHYROXINE 125 MCG TABLET: Performed by: INTERNAL MEDICINE

## 2018-01-01 PROCEDURE — 85610 PROTHROMBIN TIME: CPT | Performed by: INTERNAL MEDICINE

## 2018-01-01 PROCEDURE — 82962 GLUCOSE BLOOD TEST: CPT

## 2018-01-01 PROCEDURE — C1894 INTRO/SHEATH, NON-LASER: HCPCS | Performed by: INTERNAL MEDICINE

## 2018-01-01 PROCEDURE — 93320 DOPPLER ECHO COMPLETE: CPT

## 2018-01-01 PROCEDURE — 17110 DESTRUCTION B9 LES UP TO 14: CPT | Performed by: NURSE PRACTITIONER

## 2018-01-01 PROCEDURE — 25010000002 FENTANYL CITRATE (PF) 100 MCG/2ML SOLUTION: Performed by: INTERNAL MEDICINE

## 2018-01-01 PROCEDURE — A9270 NON-COVERED ITEM OR SERVICE: HCPCS | Performed by: INTERNAL MEDICINE

## 2018-01-01 PROCEDURE — 99152 MOD SED SAME PHYS/QHP 5/>YRS: CPT | Performed by: INTERNAL MEDICINE

## 2018-01-01 PROCEDURE — 63710000001 WARFARIN 6 MG TABLET: Performed by: INTERNAL MEDICINE

## 2018-01-01 PROCEDURE — 93320 DOPPLER ECHO COMPLETE: CPT | Performed by: INTERNAL MEDICINE

## 2018-01-01 PROCEDURE — 93451 RIGHT HEART CATH: CPT | Performed by: INTERNAL MEDICINE

## 2018-01-01 PROCEDURE — 63710000001 METOPROLOL TARTRATE 100 MG TABLET: Performed by: INTERNAL MEDICINE

## 2018-01-01 PROCEDURE — 25010000002 MIDAZOLAM PER 1 MG: Performed by: INTERNAL MEDICINE

## 2018-01-01 PROCEDURE — G0378 HOSPITAL OBSERVATION PER HR: HCPCS

## 2018-01-01 PROCEDURE — 25010000002 HEPARIN (PORCINE) PER 1000 UNITS: Performed by: INTERNAL MEDICINE

## 2018-01-01 PROCEDURE — 71045 X-RAY EXAM CHEST 1 VIEW: CPT

## 2018-01-01 PROCEDURE — 90661 CCIIV3 VAC ABX FR 0.5 ML IM: CPT | Performed by: INTERNAL MEDICINE

## 2018-01-01 PROCEDURE — 85347 COAGULATION TIME ACTIVATED: CPT

## 2018-01-01 PROCEDURE — C1769 GUIDE WIRE: HCPCS | Performed by: INTERNAL MEDICINE

## 2018-01-01 PROCEDURE — 93325 DOPPLER ECHO COLOR FLOW MAPG: CPT | Performed by: INTERNAL MEDICINE

## 2018-01-01 PROCEDURE — 25010000002 ENOXAPARIN PER 10 MG: Performed by: INTERNAL MEDICINE

## 2018-01-01 PROCEDURE — 93308 TTE F-UP OR LMTD: CPT | Performed by: INTERNAL MEDICINE

## 2018-01-01 PROCEDURE — 93010 ELECTROCARDIOGRAM REPORT: CPT | Performed by: INTERNAL MEDICINE

## 2018-01-01 PROCEDURE — 63710000001 ASPIRIN 81 MG CHEWABLE TABLET: Performed by: INTERNAL MEDICINE

## 2018-01-01 PROCEDURE — 99152 MOD SED SAME PHYS/QHP 5/>YRS: CPT

## 2018-01-01 PROCEDURE — 36415 COLL VENOUS BLD VENIPUNCTURE: CPT

## 2018-01-01 PROCEDURE — 99213 OFFICE O/P EST LOW 20 MIN: CPT | Performed by: UROLOGY

## 2018-01-01 PROCEDURE — 87086 URINE CULTURE/COLONY COUNT: CPT | Performed by: UROLOGY

## 2018-01-01 PROCEDURE — 85027 COMPLETE CBC AUTOMATED: CPT | Performed by: INTERNAL MEDICINE

## 2018-01-01 PROCEDURE — C1893 INTRO/SHEATH, FIXED,NON-PEEL: HCPCS | Performed by: INTERNAL MEDICINE

## 2018-01-01 PROCEDURE — 99204 OFFICE O/P NEW MOD 45 MIN: CPT | Performed by: UROLOGY

## 2018-01-01 PROCEDURE — 63710000001 BENZOCAINE 20 % GEL 11.9 G TUBE: Performed by: INTERNAL MEDICINE

## 2018-01-01 PROCEDURE — 63710000001 WARFARIN 1 MG TABLET: Performed by: INTERNAL MEDICINE

## 2018-01-01 PROCEDURE — 63710000001 FUROSEMIDE 40 MG TABLET: Performed by: INTERNAL MEDICINE

## 2018-01-01 PROCEDURE — 25010000002 DIPHENHYDRAMINE PER 50 MG: Performed by: INTERNAL MEDICINE

## 2018-01-01 PROCEDURE — 25010000002 SUCCINYLCHOLINE PER 20 MG: Performed by: NURSE ANESTHETIST, CERTIFIED REGISTERED

## 2018-01-01 PROCEDURE — 93000 ELECTROCARDIOGRAM COMPLETE: CPT | Performed by: NURSE PRACTITIONER

## 2018-01-01 PROCEDURE — 93005 ELECTROCARDIOGRAM TRACING: CPT | Performed by: ANESTHESIOLOGY

## 2018-01-01 PROCEDURE — 63710000001 FAMOTIDINE 20 MG TABLET: Performed by: INTERNAL MEDICINE

## 2018-01-01 PROCEDURE — 51798 US URINE CAPACITY MEASURE: CPT | Performed by: UROLOGY

## 2018-01-01 PROCEDURE — 93312 ECHO TRANSESOPHAGEAL: CPT | Performed by: INTERNAL MEDICINE

## 2018-01-01 PROCEDURE — 63710000001 DIGOXIN 125 MCG TABLET: Performed by: INTERNAL MEDICINE

## 2018-01-01 PROCEDURE — 63710000001 GABAPENTIN 100 MG CAPSULE: Performed by: INTERNAL MEDICINE

## 2018-01-01 PROCEDURE — 93325 DOPPLER ECHO COLOR FLOW MAPG: CPT

## 2018-01-01 PROCEDURE — 93321 DOPPLER ECHO F-UP/LMTD STD: CPT

## 2018-01-01 PROCEDURE — 85610 PROTHROMBIN TIME: CPT

## 2018-01-01 PROCEDURE — L1830 KO IMMOB CANVAS LONG PRE OTS: HCPCS | Performed by: INTERNAL MEDICINE

## 2018-01-01 PROCEDURE — 99215 OFFICE O/P EST HI 40 MIN: CPT | Performed by: NURSE PRACTITIONER

## 2018-01-01 PROCEDURE — 93308 TTE F-UP OR LMTD: CPT

## 2018-01-01 PROCEDURE — 25010000002 FENTANYL CITRATE (PF) 100 MCG/2ML SOLUTION: Performed by: NURSE ANESTHETIST, CERTIFIED REGISTERED

## 2018-01-01 PROCEDURE — 25010000002 INFLUENZA VAC SUBUNIT QUAD 0.5 ML SUSPENSION PREFILLED SYRINGE: Performed by: INTERNAL MEDICINE

## 2018-01-01 PROCEDURE — 99214 OFFICE O/P EST MOD 30 MIN: CPT | Performed by: NURSE PRACTITIONER

## 2018-01-01 PROCEDURE — 92987 REVISION OF MITRAL VALVE: CPT | Performed by: INTERNAL MEDICINE

## 2018-01-01 PROCEDURE — S0260 H&P FOR SURGERY: HCPCS | Performed by: INTERNAL MEDICINE

## 2018-01-01 PROCEDURE — G0008 ADMIN INFLUENZA VIRUS VAC: HCPCS | Performed by: INTERNAL MEDICINE

## 2018-01-01 PROCEDURE — 63710000001 SENNOSIDES-DOCUSATE SODIUM 8.6-50 MG TABLET: Performed by: INTERNAL MEDICINE

## 2018-01-01 PROCEDURE — 93312 ECHO TRANSESOPHAGEAL: CPT

## 2018-01-01 PROCEDURE — 99217 PR OBSERVATION CARE DISCHARGE MANAGEMENT: CPT | Performed by: PHYSICIAN ASSISTANT

## 2018-01-01 PROCEDURE — C1760 CLOSURE DEV, VASC: HCPCS | Performed by: INTERNAL MEDICINE

## 2018-01-01 PROCEDURE — 93355 ECHO TRANSESOPHAGEAL (TEE): CPT

## 2018-01-01 PROCEDURE — 63710000001 ATORVASTATIN 10 MG TABLET: Performed by: INTERNAL MEDICINE

## 2018-01-01 PROCEDURE — 80048 BASIC METABOLIC PNL TOTAL CA: CPT | Performed by: INTERNAL MEDICINE

## 2018-01-01 PROCEDURE — 80053 COMPREHEN METABOLIC PANEL: CPT | Performed by: INTERNAL MEDICINE

## 2018-01-01 PROCEDURE — 99213 OFFICE O/P EST LOW 20 MIN: CPT | Performed by: NURSE PRACTITIONER

## 2018-01-01 PROCEDURE — 93321 DOPPLER ECHO F-UP/LMTD STD: CPT | Performed by: INTERNAL MEDICINE

## 2018-01-01 PROCEDURE — 25010000002 ONDANSETRON PER 1 MG: Performed by: NURSE ANESTHETIST, CERTIFIED REGISTERED

## 2018-01-01 RX ORDER — MIDAZOLAM HYDROCHLORIDE 1 MG/ML
INJECTION INTRAMUSCULAR; INTRAVENOUS
Status: DISPENSED
Start: 2018-01-01 | End: 2018-01-01

## 2018-01-01 RX ORDER — ONDANSETRON 2 MG/ML
INJECTION INTRAMUSCULAR; INTRAVENOUS AS NEEDED
Status: DISCONTINUED | OUTPATIENT
Start: 2018-01-01 | End: 2018-01-01 | Stop reason: SURG

## 2018-01-01 RX ORDER — SODIUM CHLORIDE 9 MG/ML
50 INJECTION, SOLUTION INTRAVENOUS CONTINUOUS
Status: DISCONTINUED | OUTPATIENT
Start: 2018-01-01 | End: 2018-01-01 | Stop reason: HOSPADM

## 2018-01-01 RX ORDER — ASPIRIN 81 MG/1
324 TABLET, CHEWABLE ORAL ONCE
Status: COMPLETED | OUTPATIENT
Start: 2018-01-01 | End: 2018-01-01

## 2018-01-01 RX ORDER — WARFARIN SODIUM 5 MG/1
5 TABLET ORAL
Status: DISCONTINUED | OUTPATIENT
Start: 2018-01-01 | End: 2018-01-01 | Stop reason: HOSPADM

## 2018-01-01 RX ORDER — FENTANYL CITRATE 50 UG/ML
INJECTION, SOLUTION INTRAMUSCULAR; INTRAVENOUS
Status: DISPENSED
Start: 2018-01-01 | End: 2018-01-01

## 2018-01-01 RX ORDER — ETOMIDATE 2 MG/ML
INJECTION INTRAVENOUS AS NEEDED
Status: DISCONTINUED | OUTPATIENT
Start: 2018-01-01 | End: 2018-01-01 | Stop reason: SURG

## 2018-01-01 RX ORDER — HEPARIN SODIUM 1000 [USP'U]/ML
INJECTION, SOLUTION INTRAVENOUS; SUBCUTANEOUS AS NEEDED
Status: DISCONTINUED | OUTPATIENT
Start: 2018-01-01 | End: 2018-01-01 | Stop reason: HOSPADM

## 2018-01-01 RX ORDER — FAMOTIDINE 20 MG/1
20 TABLET, FILM COATED ORAL DAILY
Status: DISCONTINUED | OUTPATIENT
Start: 2018-01-01 | End: 2018-01-01 | Stop reason: HOSPADM

## 2018-01-01 RX ORDER — DILTIAZEM HYDROCHLORIDE 5 MG/ML
10 INJECTION INTRAVENOUS
Status: DISCONTINUED | OUTPATIENT
Start: 2018-01-01 | End: 2018-01-01 | Stop reason: HOSPADM

## 2018-01-01 RX ORDER — SODIUM CHLORIDE, SODIUM LACTATE, POTASSIUM CHLORIDE, CALCIUM CHLORIDE 600; 310; 30; 20 MG/100ML; MG/100ML; MG/100ML; MG/100ML
1000 INJECTION, SOLUTION INTRAVENOUS CONTINUOUS
Status: DISCONTINUED | OUTPATIENT
Start: 2018-01-01 | End: 2018-01-01 | Stop reason: HOSPADM

## 2018-01-01 RX ORDER — SODIUM CHLORIDE 0.9 % (FLUSH) 0.9 %
1-10 SYRINGE (ML) INJECTION AS NEEDED
Status: DISCONTINUED | OUTPATIENT
Start: 2018-01-01 | End: 2018-01-01 | Stop reason: HOSPADM

## 2018-01-01 RX ORDER — METOPROLOL TARTRATE 100 MG/1
100 TABLET ORAL EVERY 12 HOURS SCHEDULED
Status: DISCONTINUED | OUTPATIENT
Start: 2018-01-01 | End: 2018-01-01 | Stop reason: HOSPADM

## 2018-01-01 RX ORDER — SUCCINYLCHOLINE CHLORIDE 20 MG/ML
INJECTION INTRAMUSCULAR; INTRAVENOUS AS NEEDED
Status: DISCONTINUED | OUTPATIENT
Start: 2018-01-01 | End: 2018-01-01 | Stop reason: SURG

## 2018-01-01 RX ORDER — FUROSEMIDE 40 MG/1
40 TABLET ORAL
Status: DISCONTINUED | OUTPATIENT
Start: 2018-01-01 | End: 2018-01-01 | Stop reason: HOSPADM

## 2018-01-01 RX ORDER — SENNA AND DOCUSATE SODIUM 50; 8.6 MG/1; MG/1
2 TABLET, FILM COATED ORAL 2 TIMES DAILY
Status: DISCONTINUED | OUTPATIENT
Start: 2018-01-01 | End: 2018-01-01 | Stop reason: HOSPADM

## 2018-01-01 RX ORDER — SODIUM CHLORIDE 0.9 % (FLUSH) 0.9 %
1-10 SYRINGE (ML) INJECTION AS NEEDED
Status: CANCELLED | OUTPATIENT
Start: 2018-01-01

## 2018-01-01 RX ORDER — HEPARIN SODIUM 5000 [USP'U]/ML
INJECTION, SOLUTION INTRAVENOUS; SUBCUTANEOUS AS NEEDED
Status: DISCONTINUED | OUTPATIENT
Start: 2018-01-01 | End: 2018-01-01 | Stop reason: HOSPADM

## 2018-01-01 RX ORDER — DIPHENHYDRAMINE HYDROCHLORIDE 50 MG/ML
INJECTION INTRAMUSCULAR; INTRAVENOUS AS NEEDED
Status: DISCONTINUED | OUTPATIENT
Start: 2018-01-01 | End: 2018-01-01 | Stop reason: HOSPADM

## 2018-01-01 RX ORDER — NITROGLYCERIN 0.4 MG/1
0.4 TABLET SUBLINGUAL
Status: DISCONTINUED | OUTPATIENT
Start: 2018-01-01 | End: 2018-01-01 | Stop reason: HOSPADM

## 2018-01-01 RX ORDER — DIGOXIN 125 MCG
125 TABLET ORAL
Status: DISCONTINUED | OUTPATIENT
Start: 2018-01-01 | End: 2018-01-01 | Stop reason: HOSPADM

## 2018-01-01 RX ORDER — LIDOCAINE HYDROCHLORIDE 40 MG/ML
SOLUTION TOPICAL AS NEEDED
Status: DISCONTINUED | OUTPATIENT
Start: 2018-01-01 | End: 2018-01-01 | Stop reason: SURG

## 2018-01-01 RX ORDER — GABAPENTIN 100 MG/1
100 CAPSULE ORAL EVERY 8 HOURS SCHEDULED
Status: DISCONTINUED | OUTPATIENT
Start: 2018-01-01 | End: 2018-01-01 | Stop reason: HOSPADM

## 2018-01-01 RX ORDER — SODIUM CHLORIDE 0.9 % (FLUSH) 0.9 %
3 SYRINGE (ML) INJECTION AS NEEDED
Status: DISCONTINUED | OUTPATIENT
Start: 2018-01-01 | End: 2018-01-01

## 2018-01-01 RX ORDER — ATORVASTATIN CALCIUM 10 MG/1
20 TABLET, FILM COATED ORAL NIGHTLY
Status: DISCONTINUED | OUTPATIENT
Start: 2018-01-01 | End: 2018-01-01 | Stop reason: HOSPADM

## 2018-01-01 RX ORDER — LIDOCAINE HYDROCHLORIDE 20 MG/ML
INJECTION, SOLUTION INFILTRATION; PERINEURAL AS NEEDED
Status: DISCONTINUED | OUTPATIENT
Start: 2018-01-01 | End: 2018-01-01 | Stop reason: SURG

## 2018-01-01 RX ORDER — LEVOTHYROXINE SODIUM 0.12 MG/1
125 TABLET ORAL
Status: DISCONTINUED | OUTPATIENT
Start: 2018-01-01 | End: 2018-01-01 | Stop reason: HOSPADM

## 2018-01-01 RX ORDER — MIDAZOLAM HYDROCHLORIDE 1 MG/ML
INJECTION INTRAMUSCULAR; INTRAVENOUS
Status: COMPLETED | OUTPATIENT
Start: 2018-01-01 | End: 2018-01-01

## 2018-01-01 RX ORDER — TAMSULOSIN HYDROCHLORIDE 0.4 MG/1
1 CAPSULE ORAL DAILY
Qty: 30 CAPSULE | Refills: 11 | Status: SHIPPED | OUTPATIENT
Start: 2018-01-01

## 2018-01-01 RX ORDER — FENTANYL CITRATE 50 UG/ML
INJECTION, SOLUTION INTRAMUSCULAR; INTRAVENOUS
Status: COMPLETED | OUTPATIENT
Start: 2018-01-01 | End: 2018-01-01

## 2018-01-01 RX ORDER — ROCURONIUM BROMIDE 10 MG/ML
INJECTION, SOLUTION INTRAVENOUS AS NEEDED
Status: DISCONTINUED | OUTPATIENT
Start: 2018-01-01 | End: 2018-01-01 | Stop reason: SURG

## 2018-01-01 RX ORDER — METOPROLOL TARTRATE 5 MG/5ML
5 INJECTION INTRAVENOUS ONCE
Status: COMPLETED | OUTPATIENT
Start: 2018-01-01 | End: 2018-01-01

## 2018-01-01 RX ORDER — SODIUM CHLORIDE 9 MG/ML
100 INJECTION, SOLUTION INTRAVENOUS CONTINUOUS
Status: DISPENSED | OUTPATIENT
Start: 2018-01-01 | End: 2018-01-01

## 2018-01-01 RX ORDER — SODIUM CHLORIDE, SODIUM LACTATE, POTASSIUM CHLORIDE, CALCIUM CHLORIDE 600; 310; 30; 20 MG/100ML; MG/100ML; MG/100ML; MG/100ML
INJECTION, SOLUTION INTRAVENOUS CONTINUOUS PRN
Status: DISCONTINUED | OUTPATIENT
Start: 2018-01-01 | End: 2018-01-01 | Stop reason: SURG

## 2018-01-01 RX ORDER — ACETAMINOPHEN 325 MG/1
650 TABLET ORAL EVERY 4 HOURS PRN
Status: DISCONTINUED | OUTPATIENT
Start: 2018-01-01 | End: 2018-01-01 | Stop reason: HOSPADM

## 2018-01-01 RX ORDER — FENTANYL CITRATE 50 UG/ML
INJECTION, SOLUTION INTRAMUSCULAR; INTRAVENOUS AS NEEDED
Status: DISCONTINUED | OUTPATIENT
Start: 2018-01-01 | End: 2018-01-01 | Stop reason: SURG

## 2018-01-01 RX ORDER — SODIUM CHLORIDE 9 MG/ML
50 INJECTION, SOLUTION INTRAVENOUS CONTINUOUS
Status: CANCELLED | OUTPATIENT
Start: 2018-01-01

## 2018-01-01 RX ORDER — LIDOCAINE HYDROCHLORIDE 20 MG/ML
INJECTION, SOLUTION INFILTRATION; PERINEURAL AS NEEDED
Status: DISCONTINUED | OUTPATIENT
Start: 2018-01-01 | End: 2018-01-01 | Stop reason: HOSPADM

## 2018-01-01 RX ADMIN — FAMOTIDINE 20 MG: 20 TABLET ORAL at 14:54

## 2018-01-01 RX ADMIN — SUCCINYLCHOLINE CHLORIDE 100 MG: 20 INJECTION, SOLUTION INTRAMUSCULAR; INTRAVENOUS at 08:47

## 2018-01-01 RX ADMIN — ATORVASTATIN CALCIUM 20 MG: 10 TABLET, FILM COATED ORAL at 20:41

## 2018-01-01 RX ADMIN — DIGOXIN 125 MCG: 0.12 TABLET ORAL at 14:54

## 2018-01-01 RX ADMIN — METOPROLOL TARTRATE 100 MG: 100 TABLET, FILM COATED ORAL at 08:40

## 2018-01-01 RX ADMIN — Medication 1 APPLICATION: at 08:28

## 2018-01-01 RX ADMIN — GABAPENTIN 100 MG: 100 CAPSULE ORAL at 14:54

## 2018-01-01 RX ADMIN — FUROSEMIDE 40 MG: 40 TABLET ORAL at 17:42

## 2018-01-01 RX ADMIN — GABAPENTIN 100 MG: 100 CAPSULE ORAL at 13:32

## 2018-01-01 RX ADMIN — ETOMIDATE 16 MG: 2 INJECTION INTRAVENOUS at 08:47

## 2018-01-01 RX ADMIN — SODIUM CHLORIDE 50 ML/HR: 9 INJECTION, SOLUTION INTRAVENOUS at 08:00

## 2018-01-01 RX ADMIN — A/SINGAPORE/GP1908/2015 IVR-180 (H1N1) (AN A/MICHIGAN/45/2015-LIKE VIRUS), A/SINGAPORE/GP2050/2015 (H3N2) (AN A/HONG KONG/4801/2014 - LIKE VIRUS), B/UTAH/9/2014 (A B/PHUKET/3073/2013-LIKE VIRUS), B/HONG KONG/259/2010 (A B/BRISBANE/60/08-LIKE VIRUS) 0.5 ML: 15; 15; 15; 15 INJECTION, SUSPENSION INTRAMUSCULAR at 15:46

## 2018-01-01 RX ADMIN — MIDAZOLAM 1 MG: 1 INJECTION INTRAMUSCULAR; INTRAVENOUS at 08:29

## 2018-01-01 RX ADMIN — FUROSEMIDE 40 MG: 40 TABLET ORAL at 08:40

## 2018-01-01 RX ADMIN — ASPIRIN 81 MG 324 MG: 81 TABLET ORAL at 08:04

## 2018-01-01 RX ADMIN — GABAPENTIN 100 MG: 100 CAPSULE ORAL at 20:41

## 2018-01-01 RX ADMIN — LEVOTHYROXINE SODIUM 125 MCG: 0.12 TABLET ORAL at 05:32

## 2018-01-01 RX ADMIN — METOPROLOL TARTRATE 100 MG: 100 TABLET, FILM COATED ORAL at 20:41

## 2018-01-01 RX ADMIN — ENOXAPARIN SODIUM 100 MG: 100 INJECTION SUBCUTANEOUS at 14:56

## 2018-01-01 RX ADMIN — ONDANSETRON HYDROCHLORIDE 4 MG: 2 SOLUTION INTRAMUSCULAR; INTRAVENOUS at 10:48

## 2018-01-01 RX ADMIN — LIDOCAINE HYDROCHLORIDE 60 MG: 20 INJECTION, SOLUTION INFILTRATION; PERINEURAL at 08:47

## 2018-01-01 RX ADMIN — MIDAZOLAM 1 MG: 1 INJECTION INTRAMUSCULAR; INTRAVENOUS at 08:28

## 2018-01-01 RX ADMIN — FAMOTIDINE 20 MG: 20 TABLET ORAL at 08:40

## 2018-01-01 RX ADMIN — DIGOXIN 125 MCG: 0.12 TABLET ORAL at 12:33

## 2018-01-01 RX ADMIN — DILTIAZEM HYDROCHLORIDE 10 MG: 5 INJECTION INTRAVENOUS at 20:40

## 2018-01-01 RX ADMIN — SODIUM CHLORIDE, POTASSIUM CHLORIDE, SODIUM LACTATE AND CALCIUM CHLORIDE 1000 ML: 600; 310; 30; 20 INJECTION, SOLUTION INTRAVENOUS at 08:24

## 2018-01-01 RX ADMIN — SODIUM CHLORIDE, POTASSIUM CHLORIDE, SODIUM LACTATE AND CALCIUM CHLORIDE: 600; 310; 30; 20 INJECTION, SOLUTION INTRAVENOUS at 08:46

## 2018-01-01 RX ADMIN — LIDOCAINE HYDROCHLORIDE 1 EACH: 40 SOLUTION TOPICAL at 08:48

## 2018-01-01 RX ADMIN — ENOXAPARIN SODIUM 100 MG: 100 INJECTION SUBCUTANEOUS at 05:32

## 2018-01-01 RX ADMIN — GABAPENTIN 100 MG: 100 CAPSULE ORAL at 05:32

## 2018-01-01 RX ADMIN — FENTANYL CITRATE 100 MCG: 50 INJECTION, SOLUTION INTRAMUSCULAR; INTRAVENOUS at 08:47

## 2018-01-01 RX ADMIN — ROCURONIUM BROMIDE 5 MG: 10 INJECTION INTRAVENOUS at 08:47

## 2018-01-01 RX ADMIN — FENTANYL CITRATE 25 MCG: 50 INJECTION, SOLUTION INTRAMUSCULAR; INTRAVENOUS at 08:28

## 2018-01-01 RX ADMIN — LEVOTHYROXINE SODIUM 125 MCG: 0.12 TABLET ORAL at 14:54

## 2018-01-01 RX ADMIN — METOPROLOL TARTRATE 5 MG: 5 INJECTION INTRAVENOUS at 08:04

## 2018-01-01 RX ADMIN — DOCUSATE SODIUM AND SENNA 2 TABLET: 50; 8.6 TABLET, FILM COATED ORAL at 15:03

## 2018-01-01 RX ADMIN — WARFARIN SODIUM 7 MG: 6 TABLET ORAL at 17:42

## 2018-01-02 PROBLEM — I27.22 PULMONARY HYPERTENSION DUE TO LEFT HEART VALVULAR DISEASE (HCC): Status: ACTIVE | Noted: 2018-01-01

## 2018-01-02 PROBLEM — I05.0 MITRAL VALVE STENOSIS, SEVERE: Status: ACTIVE | Noted: 2018-01-01

## 2018-01-02 PROBLEM — I38 PULMONARY HYPERTENSION DUE TO LEFT HEART VALVULAR DISEASE (HCC): Status: ACTIVE | Noted: 2018-01-01

## 2018-01-12 NOTE — ANESTHESIA PREPROCEDURE EVALUATION
Anesthesia Evaluation     Patient summary reviewed and Nursing notes reviewed   no history of anesthetic complications:  NPO Solid Status: > 8 hours  NPO Liquid Status: > 8 hours     Airway   Mallampati: I  TM distance: >3 FB  Neck ROM: full  Dental          Pulmonary     breath sounds clear to auscultation  (+) shortness of breath, sleep apnea (uses O2 at night),   (-) COPD, asthma, not a smoker  Cardiovascular   Exercise tolerance: poor (<4 METS)    ECG reviewed  Patient on routine beta blocker and Beta blocker given within 24 hours of surgery  Rhythm: irregular  Rate: abnormal    (+) hypertension, valvular problems/murmurs (AVR 2007) MS, CAD, cardiac stents (x2, 2015) more than 12 months ago dysrhythmias Atrial Fib, angina (stable over past year), MURO, murmur, PVD (s/p renal artery stent), hyperlipidemia  (-) pacemaker, past MI, CHF    ROS comment: Severe pulm HTN    Cath 11/29/17 - Conclusion:  #1 Severe pulmonary hypertension  #2 Severe mitral stenosis  #3 Occlusion of SVG to the LAD with unsuccessful attempt at PCI  #2 left ventricular ejection fraction 65%    Echo 8/14/17 - ·Left ventricular systolic function is normal. Estimated EF = 60%.  ·Left atrial cavity size is moderately dilated.  ·Right ventricular cavity is mild-to-moderately dilated.  ·Mild mitral valve regurgitation is present  ·Mild-to-moderate mitral valve stenosis is present  ·Mild tricuspid valve regurgitation is present.  ·Severe pulmonary hypertension      PE comment: 108 BPM, A-fib    Neuro/Psych  (-) seizures, TIA, CVA  GI/Hepatic/Renal/Endo    (+) obesity,  GERD well controlled, diabetes mellitus, hypothyroidism,   (-) liver disease, no renal disease    Musculoskeletal     Abdominal    Substance History      OB/GYN          Other   (+) arthritis                                   Anesthesia Plan    ASA 4     general     intravenous induction   Anesthetic plan and risks discussed with patient.

## 2018-01-12 NOTE — ANESTHESIA POSTPROCEDURE EVALUATION
"Patient: Yovani Butler    Procedure Summary     Date Anesthesia Start Anesthesia Stop Room / Location    01/12/18 0846 1118 [facility] CATH LAB 1 / [facility] CATH INVASIVE LOCATION       Procedure Diagnosis Provider Provider    MItral valvuloplasty (N/A ) Mitral valve stenosis, severe; Pulmonary hypertension due to left heart valvular disease MD Shay Trujillo MD          Anesthesia Type: general  Last vitals  BP   140/83 (01/12/18 1311)   Temp   97.2 °F (36.2 °C) (01/12/18 1311)   Pulse   96 (01/12/18 1311)   Resp   18 (01/12/18 1311)     SpO2   98 % (01/12/18 1311)     Post Anesthesia Care and Evaluation    Patient location during evaluation: PACU  Patient participation: complete - patient participated  Level of consciousness: awake and alert  Pain management: adequate  Airway patency: patent  Anesthetic complications: No anesthetic complications  PONV Status: controlled  Cardiovascular status: acceptable and hemodynamically stable  Respiratory status: acceptable  Hydration status: acceptable    Comments: Patient discharged from PACU prior to anesthesia evaluation based on Lynsey Score.  For details, see RN note.     /83 (BP Location: Right arm, Patient Position: Lying)  Pulse 96  Temp 97.2 °F (36.2 °C) (Temporal Artery )   Resp 18  Ht 172.7 cm (68\")  Wt 101 kg (223 lb)  SpO2 98%  BMI 33.91 kg/m2      "

## 2018-01-12 NOTE — H&P
"Chief Complaint: Shortness of breath    HPI: This is a patient normally follows with Dr. Keyur Nguyen.  The patient has a history of coronary artery disease, status post previous coronary artery bypass grafting, paroxysmal atrial fibrillation, chronic shortness of breath and dyspnea on exertion which are currently occurring with minimal exertion.  The patient was found to have nonrheumatic mitral valve stenosis.  He has undergone previous right heart catheterization by Dr. Rolon.  He is here today for elective percutaneous mitral valvuloplasty.  The patient has been here earlier this week for a transesophageal echocardiogram.  He has been on therapeutic Lovenox and Coumadin at home.  The patient notes that he is short of breath with almost no exertion at this time.  The procedure, risks, benefits, alternatives have been explained with the patient today and he is agreeable to proceed as planned with percutaneous mitral valvuloplasty and general endotracheal anesthesia as provided by the anesthesiology service line.    I have reviewed all elements of the patient's past medical, past surgical, home medications, allergies, family and social history with the patient and updated these in the medical record as needed.    I personally performed a 14 point review of systems and found this to be negative except as otherwise noted in the HPI.    Physical Exam:   /83 (BP Location: Right arm, Patient Position: Lying)  Pulse 96  Temp 97.2 °F (36.2 °C) (Temporal Artery )   Resp 18  Ht 172.7 cm (68\")  Wt 101 kg (223 lb)  SpO2 98%  BMI 33.91 kg/m2    Gen.: Awake alert and oriented ×3 and in no acute distress  HEENT: Normocephalic, atraumatic, pupils equally round and reactive to light, oropharynx clear, mucous membranes moist  Neck: Supple, no elevation of JVP, no thyromegaly, no carotid bruits  CV: Regular rate and rhythm, no audible murmurs, rubs, gallops  Pulmonary: Clear to auscultation bilaterally, no wheezes, " rales  GI: Soft, nontender, nondistended, active bowel sounds  Extremities: Warm and well-perfused, no dermatitis or ulceration, no clubbing, cyanosis, trace edema bilaterally  Neurologic: Cranial nerves are grossly intact, patient moves all 4 extremities equally during examination    Diagnostic Data:    Lab Results   Component Value Date    WBC 10.58 01/08/2018    HGB 13.1 (L) 01/08/2018    HCT 39.4 (L) 01/08/2018    MCV 91.2 01/08/2018     01/08/2018     Lab Results   Component Value Date    GLUCOSE 92 01/08/2018    CALCIUM 9.3 01/08/2018     01/08/2018    K 4.0 01/08/2018    CO2 31.0 01/08/2018    CL 97 (L) 01/08/2018    BUN 15 01/08/2018    CREATININE 1.24 01/08/2018    EGFRIFNONA 57 (L) 01/08/2018    BCR 12.1 01/08/2018    ANIONGAP 12.0 01/08/2018     ASSESSMENT/PLAN    1. Severe native nonrheumatic mitral valve stenosis  2. Pulmonary hypertension due to left sided heart disease (MS)  3. PAF  4. CAD, native artery, native heart, no angina    - Plan PBMV today and if stable afterwards, recover and then to 4B for observation overnight  - If successful, resume therapeutic Lovenox tonight and warfarin.

## 2018-01-12 NOTE — PLAN OF CARE
Problem: Patient Care Overview (Adult)  Goal: Plan of Care Review  Outcome: Ongoing (interventions implemented as appropriate)   01/12/18 1330   Coping/Psychosocial Response Interventions   Plan Of Care Reviewed With patient   Patient Care Overview   Progress no change   Outcome Evaluation   Outcome Summary/Follow up Plan PATIENT ADMITTED TO  FROM [facility] POST MITRAL VALVULOPLASTY. RIGHT GROIN WITH SAFEGUARD C/D/I. LEFT GROIN WITH TEGADERM C/D/I. NO C/O PAIN. PATIENT AWAKE AND ALERT. KNEE IMMOBILIZER TO RIGHT KNEE. AFIB ON TELE. CONTINUE TO MONITOR.     Goal: Adult Individualization and Mutuality  Outcome: Ongoing (interventions implemented as appropriate)    Goal: Discharge Needs Assessment  Outcome: Ongoing (interventions implemented as appropriate)      Problem: Cardiac Catheterization with/without PCI (Adult)  Goal: Signs and Symptoms of Listed Potential Problems Will be Absent or Manageable (Cardiac Catheterization with/without PCI)  Outcome: Ongoing (interventions implemented as appropriate)

## 2018-01-12 NOTE — ANESTHESIA PROCEDURE NOTES
Airway  Urgency: elective    Date/Time: 1/12/2018 9:00 AM  Airway not difficult    General Information and Staff    Patient location during procedure: radiology (cath lab)  CRNA: EVE DAMON    Indications and Patient Condition  Indications for airway management: airway protection    Preoxygenated: yes  MILS maintained throughout  Mask difficulty assessment: 1 - vent by mask    Final Airway Details  Final airway type: endotracheal airway      Successful airway: ETT  Cuffed: yes   Successful intubation technique: direct laryngoscopy  Facilitating devices/methods: intubating stylet  Endotracheal tube insertion site: oral  Blade: Ruelas  Blade size: #2  ETT size: 8.0 mm  Cormack-Lehane Classification: grade IIa - partial view of glottis  Placement verified by: chest auscultation and capnometry   Cuff volume (mL): 5  Measured from: gums  ETT to gums (cm): 21  Number of attempts at approach: 1    Additional Comments  Atraumatic by rhonda olivo student

## 2018-01-13 NOTE — DISCHARGE SUMMARY
[facility], [facility] Discharge Summary    Date of Discharge:  1/13/2018    Discharge Diagnosis:   1.  Severe native MS, s/p valvuloplasty  2.  PHTN  3.  PAF  4.  CAD    Hospital Course  Patient is a 77 y.o. male presented with symptomatic MS and plans for elective mitral valvuloplasty.  He tolerated the procedure well.  There were no obvious complications.  Post procedure echo was performed and pending.  We will call the pt with results.  He is now felt stable for d/c.  He will continue Lovenox until INR therapeutic.  We will monitor his INR.    Procedures Performed  Procedure(s):  MItral valvuloplasty     Physical Exam at Discharge    Vital Signs  Temp:  [97.4 °F (36.3 °C)-99 °F (37.2 °C)] 97.5 °F (36.4 °C)  Heart Rate:  [] 69  Resp:  [18-20] 18  BP: (111-169)/(50-79) 116/61    Physical Exam:  General Appearance:    Alert, cooperative, in no acute distress   Head:    Normocephalic, without obvious abnormality, atraumatic   Eyes:            Lids and lashes normal, conjunctivae and sclerae normal, no   icterus, PERRLA, EOMI   Throat:   Oral mucosa pink and moist   Neck:   No adenopathy, supple, trachea midline, no thyromegaly, no   carotid bruit, no JVD   Lungs:     Clear to auscultation bilaterally,respirations regular, even     and unlabored    Heart:    Regular rhythm and normal rate, normal S1 and S2, no            murmur, no gallop, no rub, no click   Chest Wall:    No abnormalities observed   Abdomen:     Normal bowel sounds present in all four quadrants, no       masses, no organomegaly, soft non-tender, non-distended    Extremities:   No edema, no cyanosis, no clubbing   Pulses:   Pulses palpable and equal bilaterally   Skin:   No bleeding, bruising or rash   Psychiatric:   Displays appropriate mood and affect       Discharge Disposition  Home or Self Care    Discharge Medications   Yovani Butler   Home Medication Instructions MARIA TERESA:[id number]    Printed on:01/13/18  1339   Medication Information                      atorvastatin (LIPITOR) 20 MG tablet  Take 20 mg by mouth Daily.             digoxin (LANOXIN) 125 MCG tablet  Take 125 mcg by mouth Daily.             enoxaparin (LOVENOX) 100 MG/ML solution syringe  Inject 1 mL under the skin Every 12 (Twelve) Hours.             famotidine (PEPCID) 20 MG tablet  Take 20 mg by mouth Daily.             furosemide (LASIX) 80 MG tablet  Take 40 mg by mouth 2 (Two) Times a Day.             gabapentin (NEURONTIN) 100 MG capsule  Take 100 mg by mouth 3 (Three) Times a Day.             glipiZIDE (GLUCOTROL) 10 MG tablet  Take 10 mg by mouth 2 (Two) Times a Day Before Meals.             LEVOTHYROXINE SODIUM PO  Take 125 mcg by mouth Daily.             metoprolol tartrate (LOPRESSOR) 100 MG tablet  Take 100 mg by mouth 2 (Two) Times a Day.             nitroglycerin (NITROSTAT) 0.4 MG SL tablet  1 under the tongue as needed for angina, may repeat q5mins for up three doses             Unable to find  Med Name: oxygen at night             warfarin (COUMADIN) 5 MG tablet  Take 5 mg by mouth Daily. MWF- 7mg  Dr. Joyce regulates               Follow-up Appointments  No future appointments.  Additional Instructions for the Follow-ups that You Need to Schedule     Discharge Follow-up with Specified Provider: Dr. Charlton; 1 Month    As directed    To:  Dr. Charlton    Follow Up:  1 Month    Follow Up Details:  4-6 weeks           Protime-INR    Jan 18, 2018 (Approximate)                  Vero Disla PA-C  01/13/18  1:39 PM

## 2018-01-13 NOTE — PLAN OF CARE
Problem: Patient Care Overview (Adult)  Goal: Plan of Care Review  Outcome: Ongoing (interventions implemented as appropriate)   01/13/18 0322   Coping/Psychosocial Response Interventions   Plan Of Care Reviewed With patient   Patient Care Overview   Progress progress toward functional goals as expected   Outcome Evaluation   Outcome Summary/Follow up Plan No c/o pain voiced. Had Mitral valvuloplasty via right and left groin. Safeguard to R groin, 4x4 and tegaderm to left groin. Both soft, right more tender than left. Good pulses bilaterally. R knee immobilizer. HR increased to 150-160's AF, Dr Stein called, ordered Cardizem push 5 mg IV. HR lowered to 80's. Continue to monitor.      Goal: Adult Individualization and Mutuality  Outcome: Ongoing (interventions implemented as appropriate)    Goal: Discharge Needs Assessment  Outcome: Ongoing (interventions implemented as appropriate)      Problem: Cardiac Catheterization with/without PCI (Adult)  Goal: Signs and Symptoms of Listed Potential Problems Will be Absent or Manageable (Cardiac Catheterization with/without PCI)  Outcome: Ongoing (interventions implemented as appropriate)      Problem: Arrhythmia/Dysrhythmia (Symptomatic) (Adult)  Goal: Signs and Symptoms of Listed Potential Problems Will be Absent or Manageable (Arrhythmia/Dysrhythmia)  Outcome: Ongoing (interventions implemented as appropriate)

## 2018-02-26 PROBLEM — Z79.01 CHRONIC ANTICOAGULATION: Status: ACTIVE | Noted: 2018-01-01

## 2018-02-26 PROBLEM — Z95.1 HX OF CABG: Status: ACTIVE | Noted: 2018-01-01

## 2018-02-27 NOTE — PROGRESS NOTES
Subjective:     Encounter Date:02/26/2018      Patient ID: Yovani Butler is a 77 y.o. male patient of Dr. Nguyen's with a PMH of coronary artery disease status post CABG (SVG - LAD) in 2009 with more recent stent to SVG of LAD and cutting balloon to the native LAD in 2015, aortic stenosis with AVR in 2009, severe pulmonary hypertension, atrial fibrillation with chronic anticoagulation on Coumadin, hypertension, hyperlipidemia, diabetes mellitus, GERD, and recent mitral valvuloplasty per Dr. Charlton.  He presents today for post procedure follow up.     Chief Complaint: discharge follow up   History of Present Illness    SOB: patient has chronic shortness of breath.  Prior to procedure he was not able to make it to his mailbox, this has improved post procedure. He notes that he was diagnosed with pneumonia after his procedure and treated as an outpatient per his PCP. On home lasix with good urine output    CAD: no chest pain or anginal symptoms. Chronic dyspnea.     AF: rate controlled a fib. Notes feeling irregular beats at times. Chronically anticoagulated with Coumadin without bleeding.  Following INR with PCP      The following portions of the patient's history were reviewed and updated as appropriate: allergies, current medications, past family history, past medical history, past social history and past surgical history.     No Known Allergies    Current Outpatient Prescriptions:   •  atorvastatin (LIPITOR) 20 MG tablet, Take 20 mg by mouth Daily., Disp: , Rfl:   •  digoxin (LANOXIN) 125 MCG tablet, Take 125 mcg by mouth Daily., Disp: , Rfl:   •  famotidine (PEPCID) 20 MG tablet, Take 20 mg by mouth Daily., Disp: , Rfl:   •  furosemide (LASIX) 80 MG tablet, Take 40 mg by mouth 2 (Two) Times a Day., Disp: , Rfl:   •  gabapentin (NEURONTIN) 100 MG capsule, Take 100 mg by mouth 3 (Three) Times a Day., Disp: , Rfl:   •  glipiZIDE (GLUCOTROL) 10 MG tablet, Take 10 mg by mouth 2 (Two) Times a Day Before Meals., Disp: ,  Rfl:   •  LEVOTHYROXINE SODIUM PO, Take 125 mcg by mouth Daily., Disp: , Rfl:   •  metoprolol tartrate (LOPRESSOR) 100 MG tablet, Take 100 mg by mouth 2 (Two) Times a Day., Disp: , Rfl:   •  nitroglycerin (NITROSTAT) 0.4 MG SL tablet, 1 under the tongue as needed for angina, may repeat q5mins for up three doses, Disp: 30 tablet, Rfl: 0  •  warfarin (COUMADIN) 5 MG tablet, Take 5 mg by mouth Daily. MWF- 7mg Dr. Joyce regulates, Disp: , Rfl:     Past Medical History:   Diagnosis Date   • Abnormal glucose     nec   • AF (atrial fibrillation)    • Aortic stenosis    • Aortic valve replaced    • Arthritis    • CAD in native artery    • Carotid artery occlusion    • Chest pain    • CHF (congestive heart failure)    • Diabetes mellitus type 2, uncomplicated    • Disease of thyroid gland    • Esophageal reflux    • Exposure to asbestos     hx personal exposure to asbestos   • Hyperlipidemia    • Hypertension    • Mitral stenosis    • Murmur    • Obesity     nos   • Renal artery stenosis    • Sleep apnea, obstructive     see sleep study 7/12 - intolerant to multiple masks/Bipap   • SOB (shortness of breath)      Family History   Problem Relation Age of Onset   • Coronary artery disease Other    • Diabetes Other    • Hypertension Other    • Clotting disorder Mother    • Brain cancer Father    • No Known Problems Sister    • Cancer Brother    • Heart disease Maternal Grandfather    • Diabetes Maternal Grandfather    • Heart disease Sister    • Hypertension Sister    • Alzheimer's disease Sister    • Heart disease Sister    • Heart failure Sister    • Heart attack Sister    • Colon cancer Neg Hx    • Esophageal cancer Neg Hx      Social History     Social History   • Marital status:      Spouse name: N/A   • Number of children: N/A   • Years of education: N/A     Occupational History   • Not on file.     Social History Main Topics   • Smoking status: Former Smoker     Quit date: 1978   • Smokeless tobacco: Never Used    • Alcohol use Yes      Comment: occ.   • Drug use: No   • Sexual activity: Defer     Other Topics Concern   • Not on file     Social History Narrative     Past Surgical History:   Procedure Laterality Date   • ADENOIDECTOMY     • BREAST SURGERY      benign tumor left breast   • BYPASS GRAFT  06/16/2009    aortocoronary bypass x1   • CARDIAC CATHETERIZATION N/A 11/29/2017    Procedure: Left Heart Cath;  Surgeon: Yandel Nguyen MD;  Location:  [facility] CATH INVASIVE LOCATION;  Service:    • CARDIAC CATHETERIZATION N/A 11/29/2017    Procedure: Right Heart Cath;  Surgeon: Yandel Nguyen MD;  Location:  [facility] C[facility] INVASIVE LOCATION;  Service:    • CARDIAC CATHETERIZATION N/A 11/29/2017    Procedure: Bypass graft study;  Surgeon: Yandel Nguyen MD;  Location:  [facility]ATH INVASIVE LOC[facility];  Service:    • CARDIAC CATHETERIZATION N/A 11/29/2017    Procedure: Angioplasty-bypass graft;  Surgeon: Yandel Nguyen MD;  Location:  [facility] CATH INVASIVE LOC[facility];  Service:    • CARDIAC CATHETERIZATION  11/29/2017    Procedure: Percutaneous Manual Thrombectomy;  Surgeon: Yandel Nguyen MD;  Location:  [facility] CATH INVASIVE LOCATION;  Service:    • CARDIAC CATHETERIZATION N/A 1/12/2018    Procedure: MItral valvuloplasty;  Surgeon: Rodrigo Charlton MD;  Location: [facility] CATH INVASIVE LOCATION;  Service:    • CARDIAC VALVE REPLACEMENT  06/16/2009    transplant, heart valve: aortiv valve   • CAROTID ARTERY ANGIOPLASTY Right    • COLONOSCOPY  06/04/2015    diverticulosis left colon  hemorrhoids   • CORONARY ANGIOPLASTY     • CORONARY ARTERY BYPASS GRAFT     • CORONARY STENT PLACEMENT     • ENDOSCOPY  10/08/2014    stable 's   • ENDOSCOPY N/A 11/1/2017    Procedure: ESOPHAGOGASTRODUODENOSCOPY WITH ANESTHESIA;  Surgeon: Arun Smallwood DO;  Location: [facility] ENDOSCOPY;  Service:    • HERNIA REPAIR     • TONSILLECTOMY           Review of Systems   Constitution: Positive for malaise/fatigue. Negative for chills, diaphoresis, fever,  weight gain and weight loss.   Cardiovascular: Positive for dyspnea on exertion and leg swelling. Negative for chest pain, orthopnea, palpitations and syncope.   Respiratory: Positive for shortness of breath.    Hematologic/Lymphatic: Negative for bleeding problem. Bruises/bleeds easily.   Skin: Negative for dry skin.   Musculoskeletal: Negative for back pain and falls.   Gastrointestinal: Negative for constipation, nausea and vomiting.   Genitourinary: Negative for dysuria.   Neurological: Negative for dizziness, headaches and light-headedness.   All other systems reviewed and are negative.        ECG 12 Lead  Date/Time: 2/27/2018 4:09 PM  Performed by: KIKI ZAVALA  Authorized by: KIKI ZAVALA   Comparison: compared with previous ECG from 1/12/2018  Similar to previous ECG  Comparison to previous ECG: Rate has decreased by 44  Rhythm: atrial fibrillation  Rate: normal  BPM: 64  ST Segments: ST segments normal  T Waves: T waves normal  QRS axis: normal  Clinical impression: abnormal ECG          Vitals:    02/26/18 1430   BP: 138/81   Pulse: 64          Objective:     Physical Exam   Constitutional: He is oriented to person, place, and time. He appears well-developed and well-nourished. No distress.   HENT:   Head: Normocephalic and atraumatic.   Eyes: Conjunctivae are normal.   Neck: Normal range of motion. Neck supple.   Cardiovascular: Normal rate.  An irregularly irregular rhythm present.   Murmur heard.  Pulmonary/Chest: Effort normal and breath sounds normal. No respiratory distress. He has no wheezes. He has no rales.   Abdominal: Soft. Normal appearance and bowel sounds are normal. He exhibits no distension. There is no tenderness.   Musculoskeletal: Normal range of motion. He exhibits no edema (trace edema non pitting).   Neurological: He is alert and oriented to person, place, and time.   Skin: Skin is warm, dry and intact. He is not diaphoretic.   Psychiatric: He has a normal mood and affect. His  behavior is normal.   Vitals reviewed.      Lab Review:       Assessment:          Diagnosis Plan   1. Coronary artery disease involving coronary bypass graft of native heart without angina pectoris     2. Hx of CABG     3. Paroxysmal atrial fibrillation     4. Chronic anticoagulation     5. H/O aortic valve replacement     6. Severe mitral valve stenosis     7. Pulmonary hypertension, moderate to severe            Plan:       - CAD: no angina. No chest pain. Hx of bypass. Continue same meds.    - CABG: SVG-LAD in 2009    - PAF: has recently been in atrial fibrillation. Rate controlled anticoagulated. Without complaints. ? Chronic vs paroxysmal    - anticoag: coumadin following with PCP    - AVR: 2009    - MS: recent valvuloplasty some improvement since then. Has also had pneumonia. Patient has also had pneumonia since discharge. Does state that he is better and tolerating activities better. If continues to decline may needs CTS consult to evaluate for surgical management.    - PHTN: severe. Follows with Pulm office.    RTC: in 3 months. Sooner if needed if worsening.

## 2018-06-18 PROBLEM — R30.0 DYSURIA: Status: ACTIVE | Noted: 2018-01-01

## 2018-06-18 NOTE — PROGRESS NOTES
"    Subjective:     Encounter Date:06/18/2018      Patient ID: Yovani Butler is a 78 y.o. male patient of Dr. Wesley with a PMH of coronary artery disease status post CABG (SVG - LAD) in 2009 with more recent stent to SVG of LAD and cutting balloon to the native LAD in 2015, aortic stenosis with AVR in 2009, severe pulmonary hypertension, atrial fibrillation with chronic anticoagulation on Coumadin, hypertension, hyperlipidemia, diabetes mellitus, GERD, and recent mitral valvuloplasty per Dr. Charlton.  He presents today for follow up.     Chief Complaint: Follow up  Shortness of Breath   This is a recurrent problem. The current episode started more than 1 month ago. The problem occurs daily. The problem has been gradually worsening. Associated symptoms include leg swelling. Pertinent negatives include no abdominal pain, chest pain, fever, headaches, leg pain, orthopnea, PND, syncope, vomiting or wheezing. The symptoms are aggravated by any activity.     He notes that his shortness of breath is about as bad it was prior to the valvuloplasty. He notes that he can tolerate flat ground fairly well. But if there are hills or stairs he has to stop and rest. \"I'm about back to where I was prior to the procedure.\"  His mailbox is about 75 feet away from his house and he can tolerate this. He notes that this is all on flat ground. He likes to work out in his garden but is unable to tolerate lots of activity without having to rest.     The following portions of the patient's history were reviewed and updated as appropriate: allergies, current medications, past family history, past medical history, past social history and past surgical history.       Review of Systems   Constitution: Positive for malaise/fatigue. Negative for chills, diaphoresis and fever.   Eyes: Negative for visual disturbance.   Cardiovascular: Positive for dyspnea on exertion and leg swelling. Negative for chest pain, near-syncope, orthopnea, paroxysmal " "nocturnal dyspnea and syncope.   Respiratory: Positive for shortness of breath. Negative for cough and wheezing.    Hematologic/Lymphatic: Negative for bleeding problem. Bruises/bleeds easily.   Musculoskeletal: Positive for arthritis, falls and joint pain (hands). Gout: within last month.   Gastrointestinal: Negative for bloating, abdominal pain, nausea and vomiting.   Genitourinary: Positive for dysuria.        Notes he has a \"weak stream\" and lots of \"dribbling\"   Neurological: Positive for loss of balance. Negative for headaches and light-headedness.   Psychiatric/Behavioral: The patient is not nervous/anxious.    All other systems reviewed and are negative.        ECG 12 Lead  Date/Time: 6/18/2018 10:08 AM  Performed by: KIKI ZAVALA  Authorized by: KIKI ZAVALA   Comparison: compared with previous ECG from 2/26/2018  Similar to previous ECG  Rhythm: atrial fibrillation  Rate: bradycardic  BPM: 57  QRS axis: normal  Clinical impression: abnormal ECG          Vitals:    06/18/18 0903   BP: 110/70   Pulse: 57     1    06/18/18  0903   Weight: 98.3 kg (216 lb 12.8 oz)          Objective:     Physical Exam   Constitutional: He is oriented to person, place, and time. Vital signs are normal. He appears well-developed and well-nourished. He is cooperative. No distress.   HENT:   Head: Normocephalic and atraumatic.   Mouth/Throat: Oropharynx is clear and moist. No oropharyngeal exudate.   Eyes: Conjunctivae are normal. Right eye exhibits no discharge. Left eye exhibits no discharge.   Neck: Normal range of motion. Neck supple.   Cardiovascular: Normal heart sounds and intact distal pulses.  An irregularly irregular rhythm present. Bradycardia present.  Exam reveals no gallop and no friction rub.    No murmur heard.  Pulmonary/Chest: Effort normal and breath sounds normal. No respiratory distress. He has no wheezes. He has no rhonchi. He has no rales. He exhibits no tenderness.   Abdominal: Soft. Normal " appearance. He exhibits no distension. There is no tenderness.   Musculoskeletal: Normal range of motion. He exhibits edema. He exhibits no tenderness or deformity.   Neurological: He is alert and oriented to person, place, and time.   Skin: Skin is warm, dry and intact. Bruising noted. No rash noted. He is not diaphoretic. No erythema. No pallor.   Psychiatric: He has a normal mood and affect. His speech is normal and behavior is normal. His mood appears not anxious. His affect is not angry. He does not exhibit a depressed mood.   Vitals reviewed.      Lab Review:       Assessment:          Diagnosis Plan   1. Mitral valve stenosis, severe  Ambulatory Referral to Cardiothoracic Surgery   2. Shortness of breath     3. Dysuria  Ambulatory Referral to Urology    Urinalysis With Microscopic - Urine, Clean Catch   4. Moderate to severe pulmonary hypertension  Ambulatory Referral to Cardiothoracic Surgery   5. Coronary artery disease involving coronary bypass graft of native heart without angina pectoris     6. Chronic atrial fibrillation     7. Chronic anticoagulation     8. H/O aortic valve replacement     9. Hx of CABG            Plan:       - MS: severe. This was treated with Valvuloplasty per Dr. Charlton in January of 2018. This improved symptoms greatly, however has been short term. He notes that he is back to where he was prior to procedure. He is open to exploring other options. Will refer to discuss surgical options with CTS. Patient notes that Dr. Erazo was his Surgeon for his AVR and he would like to discuss with him. Echo competed in January.    - SOB: secondary to #1.     - Dysuria: he notes this is a new problem and has not seen his PCP for this. I will refer to urology for him as this is continuing to bother him. He notes 2 weeks of burning with urination, weak stream, and dribbling. Will order UA.    - PHTN: related to his MR/MS    - CAD: history of CABG in 2009. No chest pain or pressure. Complains of  shortness of breath, although this is likely secondary to his valve issue. Not on aspirin as he takes Coumadin.     - AF: appears to be chronic.  Rate controlled and anticoagulated with coumadin which is followed per his PCP.     - Anticoagulation: Coumadin    - Valve/CABG Hx: AVR CABG per Castro 2009    Follow up with us in 3 months. Set up referral for urology and CTS. Continue management of symptoms at home with current regimen. Monitor sodium intake.  Continue following with PCP regularly. Follow up sooner if symptoms worsen.

## 2018-06-21 NOTE — PATIENT INSTRUCTIONS

## 2018-06-21 NOTE — PROGRESS NOTES
Mr. Butler is 78 y.o. male    Chief Complaint   Patient presents with   • Difficulty Urinating   • Benign Prostatic Hypertrophy       Benign Prostatic Hypertrophy   This is a chronic problem. The current episode started more than 1 year ago. The problem has been waxing and waning since onset. Irritative symptoms include frequency and urgency. Associated symptoms include dysuria. Pertinent negatives include no chills or hematuria. Nothing aggravates the symptoms. Past treatments include nothing. The treatment provided no relief.   Difficulty Urinating   This is a new problem. The current episode started 1 to 4 weeks ago. The problem occurs intermittently. The problem has been waxing and waning. Pertinent negatives include no abdominal pain, chills or fever. He has tried nothing for the symptoms.       The following portions of the patient's history were reviewed and updated as appropriate: allergies, current medications, past family history, past medical history, past social history, past surgical history and problem list.    Review of Systems   Constitutional: Negative for chills and fever.   Gastrointestinal: Negative for abdominal pain, anal bleeding and blood in stool.   Genitourinary: Positive for difficulty urinating, dysuria, flank pain (Bilateral), frequency and urgency. Negative for hematuria.         Current Outpatient Prescriptions:   •  atorvastatin (LIPITOR) 20 MG tablet, Take 20 mg by mouth Daily., Disp: , Rfl:   •  digoxin (LANOXIN) 125 MCG tablet, Take 125 mcg by mouth Daily., Disp: , Rfl:   •  famotidine (PEPCID) 20 MG tablet, Take 20 mg by mouth Daily., Disp: , Rfl:   •  furosemide (LASIX) 80 MG tablet, Take 40 mg by mouth 2 (Two) Times a Day., Disp: , Rfl:   •  gabapentin (NEURONTIN) 100 MG capsule, Take 100 mg by mouth 3 (Three) Times a Day., Disp: , Rfl:   •  glipiZIDE (GLUCOTROL) 10 MG tablet, Take 10 mg by mouth 2 (Two) Times a Day Before Meals., Disp: , Rfl:   •  LEVOTHYROXINE SODIUM PO, Take  112 mcg by mouth Daily., Disp: , Rfl:   •  metoprolol tartrate (LOPRESSOR) 100 MG tablet, Take 100 mg by mouth 2 (Two) Times a Day., Disp: , Rfl:   •  nitroglycerin (NITROSTAT) 0.4 MG SL tablet, 1 under the tongue as needed for angina, may repeat q5mins for up three doses, Disp: 30 tablet, Rfl: 0  •  warfarin (COUMADIN) 5 MG tablet, Take 5 mg by mouth Daily. MWF- 7mg Dr. Joyce regulates, Disp: , Rfl:   •  tamsulosin (FLOMAX) 0.4 MG capsule 24 hr capsule, Take 1 capsule by mouth Daily., Disp: 30 capsule, Rfl: 11    Past Medical History:   Diagnosis Date   • Abnormal glucose     nec   • AF (atrial fibrillation)    • Aortic stenosis    • Aortic valve replaced    • Arthritis    • CAD in native artery    • Carotid artery occlusion    • Chest pain    • CHF (congestive heart failure)    • Diabetes mellitus type 2, uncomplicated    • Disease of thyroid gland    • Esophageal reflux    • Exposure to asbestos     hx personal exposure to asbestos   • Hyperlipidemia    • Hypertension    • Mitral stenosis    • Murmur    • Obesity     nos   • Renal artery stenosis    • Sleep apnea, obstructive     see sleep study 7/12 - intolerant to multiple masks/Bipap   • SOB (shortness of breath)        Past Surgical History:   Procedure Laterality Date   • ADENOIDECTOMY     • BREAST SURGERY      benign tumor left breast   • BYPASS GRAFT  06/16/2009    aortocoronary bypass x1   • CARDIAC CATHETERIZATION N/A 11/29/2017    Procedure: Left Heart Cath;  Surgeon: Yandel Nguyen MD;  Location:  [facility];  Service:    • CARDIAC CATHETERIZATION N/A 11/29/2017    Procedure: Right Heart Cath;  Surgeon: Yandel Nguyen MD;  Location:  [facility];  Service:    • CARDIAC CATHETERIZATION N/A 11/29/2017    Procedure: Bypass graft study;  Surgeon: Yandel Nguyen MD;  Location:  [facility];  Service:    • CARDIAC CATHETERIZATION N/A 11/29/2017    Procedure: Angioplasty-bypass graft;  Surgeon: Yandel Nguyen  "MD;  Location:  [facility];  Service:    • CARDIAC CATHETERIZATION  11/29/2017    Procedure: Percutaneous Manual Thrombectomy;  Surgeon: Yandel Nguyen MD;  Location:  [facility];  Service:    • CARDIAC CATHETERIZATION N/A 1/12/2018    Procedure: MItral valvuloplasty;  Surgeon: Rodrigo Charlton MD;  Location:  [facility];  Service:    • CARDIAC VALVE REPLACEMENT  06/16/2009    transplant, heart valve: aortiv valve   • CAROTID ARTERY ANGIOPLASTY Right    • COLONOSCOPY  06/04/2015    diverticulosis left colon  hemorrhoids   • CORONARY ANGIOPLASTY     • CORONARY ARTERY BYPASS GRAFT     • CORONARY STENT PLACEMENT     • ENDOSCOPY  10/08/2014    stable 's   • ENDOSCOPY N/A 11/1/2017    Procedure: ESOPHAGOGASTRODUODENOSCOPY WITH ANESTHESIA;  Surgeon: Arun Smallwood DO;  Location: [facility] ENDOSCOPY;  Service:    • HERNIA REPAIR     • TONSILLECTOMY         Social History     Social History   • Marital status:      Social History Main Topics   • Smoking status: Former Smoker     Quit date: 1978   • Smokeless tobacco: Never Used   • Alcohol use Yes      Comment: occ.   • Drug use: No   • Sexual activity: Defer     Other Topics Concern   • Not on file       Family History   Problem Relation Age of Onset   • Coronary artery disease Other    • Diabetes Other    • Hypertension Other    • Clotting disorder Mother    • Brain cancer Father    • No Known Problems Sister    • Cancer Brother    • Heart disease Maternal Grandfather    • Diabetes Maternal Grandfather    • Heart disease Sister    • Hypertension Sister    • Alzheimer's disease Sister    • Heart disease Sister    • Heart failure Sister    • Heart attack Sister    • Colon cancer Neg Hx    • Esophageal cancer Neg Hx        Objective    Temp 97.6 °F (36.4 °C)   Ht 175.3 cm (69\")   Wt 98.3 kg (216 lb 12.8 oz)   BMI 32.02 kg/m²     Physical Exam  Constitutional: Well nourished, Well developed; No " apparent distress  Psychiatric: Appropriate affect; Alert and oriented  Eyes: Unremarkable  Musculoskeletal: Normal gait and station  GI: Abdomen is soft, non-tender  Respiratory: No distress; Unlabored movement; No accessory musculature needed with symmetric movements  Skin: No pallor or diaphoresis  ; Penis and testicles are normal; Prostate 50 mL without nodule        Admission on 01/12/2018, Discharged on 01/13/2018   Component Date Value Ref Range Status   • BSA 01/12/2018 2.1  m^2 Preliminary   • MV V2 max 01/12/2018 123.0  cm/sec Preliminary   • MV max PG 01/12/2018 6.1  mmHg Preliminary   • MV V2 mean 01/12/2018 84.8  cm/sec Preliminary   • MV mean PG 01/12/2018 3.0  mmHg Preliminary   • MV V2 VTI 01/12/2018 43.2  cm Preliminary   • MV P1/2t max caleb 01/12/2018 168.0  cm/sec Preliminary   • MV P1/2t 01/12/2018 197.6  msec Preliminary   • MVA(P1/2t) 01/12/2018 1.1  cm^2 Preliminary   • MV dec slope 01/12/2018 249.0  cm/sec^2 Preliminary   • MVA P1/2T LCG 01/12/2018 1.3  cm^2 Preliminary   • BH CV ECHO JB - BZI_BMI 01/12/2018 33.6  kilograms/m^2 Preliminary   • BH CV ECHO JB - BSA([facility]) 01/12/2018 2.2  m^2 Preliminary   • BH CV ECHO JB - BZI_METRIC_WEIGHT 01/12/2018 100.2  kg Preliminary   • BH CV ECHO JB - BZI_METRIC_HEIGHT 01/12/2018 172.7  cm Preliminary   • Glucose 01/12/2018 153* 70 - 130 mg/dL Final    : [id number] Emani HannahMeter ID: [id number]   • Protime 01/12/2018 15.0* 10.0 - 13.8 seconds Final    : [id number] Emani GregelleMeter ID: [id number]   • INR 01/12/2018 1.3* 0.91 - 1.09 Final   • Protime 01/12/2018 15.7* 11.9 - 14.6 Seconds Final   • INR 01/12/2018 1.21* 0.91 - 1.09 Final   • BSA 01/13/2018 2.1  m^2 Preliminary   • LVLd ap4 01/13/2018 6.6  cm Preliminary   • EDV(MOD-sp4) 01/13/2018 50.5  ml Preliminary   • LVLs ap4 01/13/2018 6.3  cm Preliminary   • ESV(MOD-sp4) 01/13/2018 21.7  ml Preliminary   • SV(MOD-sp4) 01/13/2018 28.8  ml Preliminary   • SI(MOD-sp4) 01/13/2018  13.5  ml/m^2 Preliminary   • CONTRAST EF 4CH 01/13/2018 57.0  ml/m^2 Preliminary   • LV Diastolic corrected for BSA 01/13/2018 23.6  ml/m^2 Preliminary   • LV Systolic corrected for BSA 01/13/2018 10.1  ml/m^2 Preliminary   • MV E max caleb 01/13/2018 236.0  cm/sec Preliminary   • MV V2 max 01/13/2018 267.3  cm/sec Preliminary   • MV max PG 01/13/2018 28.7  mmHg Preliminary   • MV V2 mean 01/13/2018 134.0  cm/sec Preliminary   • MV mean PG 01/13/2018 9.7  mmHg Preliminary   • MV V2 VTI 01/13/2018 80.0  cm Preliminary   • MV P1/2t max caleb 01/13/2018 260.0  cm/sec Preliminary   • MV P1/2t 01/13/2018 132.9  msec Preliminary   • MVA(P1/2t) 01/13/2018 1.7  cm^2 Preliminary   • MV dec slope 01/13/2018 573.0  cm/sec^2 Preliminary   • MV dec time 01/13/2018 0.47  sec Preliminary   • Ao pk caleb 01/13/2018 140.0  cm/sec Preliminary   • Ao max PG 01/13/2018 7.8  mmHg Preliminary   • MR max caleb 01/13/2018 491.0  cm/sec Preliminary   • MR max PG 01/13/2018 96.4  mmHg Preliminary   • TR max caleb 01/13/2018 376.0  cm/sec Preliminary   • RVSP(TR) 01/13/2018 66.6  mmHg Preliminary   • RAP systole 01/13/2018 10.0  mmHg Preliminary   • MVA P1/2T LCG 01/13/2018 0.85  cm^2 Preliminary   • BH CV ECHO JB - BZI_BMI 01/13/2018 33.9  kilograms/m^2 Preliminary   • BH CV ECHO JB - BSA([facility]) 01/13/2018 2.2  m^2 Preliminary   • BH CV ECHO JB - BZI_METRIC_WEIGHT 01/13/2018 101.2  kg Preliminary   • BH CV ECHO JB - BZI_METRIC_HEIGHT 01/13/2018 172.7  cm Preliminary   • Target HR (85%) 01/13/2018 122  bpm Final   • Max. Pred. HR (100%) 01/13/2018 144  bpm Final   • TV mean gradient 01/13/2018 9  mmHg Final   • Activated Clotting Time  01/12/2018 197* 82 - 152 Seconds Final    Serial Number: [id number]:  [id number]   • Activated Clotting Time  01/12/2018 246* 82 - 152 Seconds Final    Serial Number: [id number]:  [id number]   • Activated Clotting Time  01/12/2018 279* 82 - 152 Seconds Final    Serial Number: [id number]:  [id number]   • Protime  01/13/2018 16.4* 11.9 - 14.6 Seconds Final   • INR 01/13/2018 1.28* 0.91 - 1.09 Final   • WBC 01/13/2018 7.71  4.80 - 10.80 10*3/mm3 Final   • RBC 01/13/2018 3.58* 4.80 - 5.90 10*6/mm3 Final   • Hemoglobin 01/13/2018 10.7* 14.0 - 18.0 g/dL Final   • Hematocrit 01/13/2018 32.0* 40.0 - 52.0 % Final   • MCV 01/13/2018 89.4  82.0 - 95.0 fL Final   • MCH 01/13/2018 29.9  28.0 - 32.0 pg Final   • MCHC 01/13/2018 33.4  33.0 - 36.0 g/dL Final   • RDW 01/13/2018 14.6  12.0 - 15.0 % Final   • RDW-SD 01/13/2018 47.2  40.0 - 54.0 fl Final   • MPV 01/13/2018 9.7  6.0 - 12.0 fL Final   • Platelets 01/13/2018 243  130 - 400 10*3/mm3 Final   • Glucose 01/13/2018 163* 70 - 100 mg/dL Final   • BUN 01/13/2018 17  5 - 21 mg/dL Final   • Creatinine 01/13/2018 1.27  0.50 - 1.40 mg/dL Final   • Sodium 01/13/2018 137  135 - 145 mmol/L Final   • Potassium 01/13/2018 3.6  3.5 - 5.3 mmol/L Final   • Chloride 01/13/2018 97* 98 - 110 mmol/L Final   • CO2 01/13/2018 29.0  24.0 - 31.0 mmol/L Final   • Calcium 01/13/2018 8.6  8.4 - 10.4 mg/dL Final   • eGFR Non African Amer 01/13/2018 55* >60 mL/min/1.73 Final   • BUN/Creatinine Ratio 01/13/2018 13.4  7.0 - 25.0 Final   • Anion Gap 01/13/2018 11.0  4.0 - 13.0 mmol/L Final       Results for orders placed or performed in visit on 06/21/18   POC Urinalysis Dipstick, Multipro   Result Value Ref Range    Color Yellow Yellow, Straw, Dark Yellow, Shona    Clarity, UA Clear Clear    Glucose, UA Negative Negative, 1000 mg/dL (3+) mg/dL    Bilirubin Negative Negative    Ketones, UA Negative Negative    Specific Gravity  1.015 1.005 - 1.030    Blood, UA Trace (A) Negative    pH, Urine 6.0 5.0 - 8.0    Protein,  mg/dL (A) Negative mg/dL    Urobilinogen, UA 4 E.U./dL  (A) Normal    Nitrite, UA Negative Negative    Leukocytes Negative Negative       Bladder Scan interpretation  Estimation of residual urine via abdominal ultrasound  Residual Urine: 26 ml  Indication: Dysuria  Position:  Supine  Examination: Incremental scanning of the suprapubic area using 3 MHz transducer using copious amounts of acoustic gel.   Findings: An anechoic area was demonstrated which represented the bladder, with measurement of residual urine as noted. I inspected this myself. In that the residual urine was stable or insignificant, no treatment will be necessary at this time.             Assessment and Plan    Yovani was seen today for difficulty urinating and benign prostatic hypertrophy.    Diagnoses and all orders for this visit:    Dysuria  -     POC Urinalysis Dipstick, Multipro  -     Urine Culture - Urine, Urine, Clean Catch    Benign prostatic hyperplasia without lower urinary tract symptoms  -     tamsulosin (FLOMAX) 0.4 MG capsule 24 hr capsule; Take 1 capsule by mouth Daily.    I reviewed his outside records.  In summary, this is a 78-year-old gentleman with significant cardiac disease who complained to his cardiologist about dysuria and was referred to our office.  In reviewing Dr. Velez's notes, his previous urologist, he did have similar problems approximately 8 years ago.  He was started on Flomax by Dr. Velez which she apparently did tolerate.  On further questioning, patient did stop this medication but is unsure why.  He believes that it helped.    I will start with the urine culture to rule out infection.  I do believe that most of his problems are related to BPH.  I started him on Flomax today.  He understands the risks and benefits of this medications as outlined below.  I have encouraged him not to drive within 24 hours of starting this medication to ensure he does not have any significant dizziness.    He and I discussed BPH today including the pathophysiology, diagnosis, and management. The role of PSA in management of PSA is discussed.  The patient understands the purpose of a uroflow, post void residual and the need for cystoscopy. We discussed the role of Alpha blockers, 5-Alpha redcuctase  inhibitors, and anticholinergics. Minimally invasive techniques including TUNA, TUMT, Interstitial laser, and WIT are considered. TUIP, TURP, & Laser ablation are also explained as more invasive techniques. TURP is acknowledged to be the gold standard for surgical management. Behavioral, dietary, and herbal therapy are also discussed pointing out the limited available data for the latter. Based upon his symptomatology, we have elected to begin a trial of alpha blockade. The risks of orthostasis, central mediated dizziness, ejaculatory dysfunction, reflux, & rhinitis are discussed with the patient.

## 2018-07-02 PROBLEM — L57.0 ACTINIC KERATOSIS: Status: ACTIVE | Noted: 2018-01-01

## 2018-07-02 NOTE — PROGRESS NOTES
CC:   Chief Complaint   Patient presents with   • Skin Lesion     RIGHT BROW LESION / RIGHT CHEEK       HPI: Yovani Butler is a 78 y.o. male who reports a skin lesion on the right lateral brow.  This lesion has been present for >5 years.  The lesion has changed. He reports the lesion won't heal.  Nothing makes the lesion better or worse.  A biopsy has not been performed.    He also complains of a skin lesion of the right temple. The lesion has been present for 1 year. Symptoms associated with the lesion are: won't heal. Patient denies increasing diameter, increasing thickness, itching, bleeding, pain, drainage, infection. Nothing makes the lesion better or worse. A biopsy has not been performed.     Prior history of skin cancer: actinic keratoses    Dermatologist: PCP    He is currently taking Coumadin- history of heart stents x 2, AFib, and aortic valve replacement. Dr. Nguyen is his cardiologist    PFSH:  Past Medical History:   Diagnosis Date   • Abnormal glucose     nec   • AF (atrial fibrillation) (CMS/HCC)    • Aortic stenosis    • Aortic valve replaced    • Arthritis    • CAD in native artery    • Carotid artery occlusion    • Chest pain    • CHF (congestive heart failure) (CMS/HCC)    • Diabetes mellitus type 2, uncomplicated (CMS/HCC)    • Disease of thyroid gland    • Esophageal reflux    • Exposure to asbestos     hx personal exposure to asbestos   • Hyperlipidemia    • Hypertension    • Mitral stenosis    • Murmur    • Neoplasm of uncertain behavior    • Obesity     nos   • Renal artery stenosis (CMS/HCC)    • Sleep apnea, obstructive     see sleep study 7/12 - intolerant to multiple masks/Bipap   • SOB (shortness of breath)      Past Surgical History:   Procedure Laterality Date   • ADENOIDECTOMY     • BREAST SURGERY      benign tumor left breast   • BYPASS GRAFT  06/16/2009    aortocoronary bypass x1   • CARDIAC CATHETERIZATION N/A 11/29/2017    Procedure: Left Heart Cath;  Surgeon: Yandel Nguyen MD;   Location:  [facility];  Service:    • CARDIAC CATHETERIZATION N/A 11/29/2017    Procedure: Right Heart Cath;  Surgeon: Yandel Nguyen MD;  Location:  [facility];  Service:    • CARDIAC CATHETERIZATION N/A 11/29/2017    Procedure: Bypass graft study;  Surgeon: Yandel Nguyen MD;  Location:  [facility];  Service:    • CARDIAC CATHETERIZATION N/A 11/29/2017    Procedure: Angioplasty-bypass graft;  Surgeon: Yandel Nguyen MD;  Location:  [facility];  Service:    • CARDIAC CATHETERIZATION  11/29/2017    Procedure: Percutaneous Manual Thrombectomy;  Surgeon: Yandel Nguyen MD;  Location:  [facility];  Service:    • CARDIAC CATHETERIZATION N/A 1/12/2018    Procedure: MItral valvuloplasty;  Surgeon: Rodrigo Charlton MD;  Location: [facility] CATH INVASIVE LOCATION;  Service:    • CARDIAC VALVE REPLACEMENT  06/16/2009    transplant, heart valve: aortiv valve   • CAROTID ARTERY ANGIOPLASTY Right    • COLONOSCOPY  06/04/2015    diverticulosis left colon  hemorrhoids   • CORONARY ANGIOPLASTY     • CORONARY ARTERY BYPASS GRAFT     • CORONARY STENT PLACEMENT     • ENDOSCOPY  10/08/2014    stable 's   • ENDOSCOPY N/A 11/1/2017    Procedure: ESOPHAGOGASTRODUODENOSCOPY WITH ANESTHESIA;  Surgeon: Arun Smallwood DO;  Location: [facility] ENDOSCOPY;  Service:    • HERNIA REPAIR     • TONSILLECTOMY       Family History   Problem Relation Age of Onset   • Coronary artery disease Other    • Diabetes Other    • Hypertension Other    • Clotting disorder Mother    • Brain cancer Father    • No Known Problems Sister    • Cancer Brother    • Heart disease Maternal Grandfather    • Diabetes Maternal Grandfather    • Heart disease Sister    • Hypertension Sister    • Alzheimer's disease Sister    • Heart disease Sister    • Heart failure Sister    • Heart attack Sister    • Colon cancer Neg Hx    • Esophageal cancer Neg Hx      Social History  "  Substance Use Topics   • Smoking status: Former Smoker     Quit date: 1978   • Smokeless tobacco: Never Used   • Alcohol use Yes      Comment: occ.     Allergies:  Patient has no known allergies.    Current Outpatient Prescriptions:   •  atorvastatin (LIPITOR) 20 MG tablet, Take 20 mg by mouth Daily., Disp: , Rfl:   •  digoxin (LANOXIN) 125 MCG tablet, Take 125 mcg by mouth Daily., Disp: , Rfl:   •  famotidine (PEPCID) 20 MG tablet, Take 20 mg by mouth Daily., Disp: , Rfl:   •  furosemide (LASIX) 80 MG tablet, Take 40 mg by mouth 2 (Two) Times a Day., Disp: , Rfl:   •  gabapentin (NEURONTIN) 100 MG capsule, Take 100 mg by mouth 3 (Three) Times a Day., Disp: , Rfl:   •  glipiZIDE (GLUCOTROL) 10 MG tablet, Take 10 mg by mouth 2 (Two) Times a Day Before Meals., Disp: , Rfl:   •  LEVOTHYROXINE SODIUM PO, Take 112 mcg by mouth Daily., Disp: , Rfl:   •  metoprolol tartrate (LOPRESSOR) 100 MG tablet, Take 100 mg by mouth 2 (Two) Times a Day., Disp: , Rfl:   •  nitroglycerin (NITROSTAT) 0.4 MG SL tablet, 1 under the tongue as needed for angina, may repeat q5mins for up three doses, Disp: 30 tablet, Rfl: 0  •  tamsulosin (FLOMAX) 0.4 MG capsule 24 hr capsule, Take 1 capsule by mouth Daily., Disp: 30 capsule, Rfl: 11  •  warfarin (COUMADIN) 5 MG tablet, Take 5 mg by mouth Daily. MWF- 7mg Dr. Joyce regulates, Disp: , Rfl:     ROS:  Review of Systems   Constitutional: Negative for activity change, appetite change, chills, fatigue, fever and unexpected weight change.   HENT: Negative for congestion, dental problem, facial swelling and nosebleeds.    Eyes: Negative for discharge and redness.   Musculoskeletal: Positive for arthralgias.   Skin: Negative for color change, pallor and rash.   Hematological: Negative for adenopathy. Bruises/bleeds easily.   All other systems reviewed and are negative.      PE:  /80   Pulse 67   Temp 98 °F (36.7 °C)   Resp 20   Ht 175.3 cm (69\")   Wt 99.8 kg (220 lb)   BMI 32.49 kg/m² "   Physical Exam   Constitutional: He is oriented to person, place, and time. He appears well-developed and well-nourished. He is cooperative. No distress.   HENT:   Head: Normocephalic and atraumatic.   Right Ear: External ear normal.   Left Ear: External ear normal.   Nose: No nasal deformity.   Mouth/Throat: Uvula is midline, oropharynx is clear and moist and mucous membranes are normal.   Eyes: Conjunctivae, EOM and lids are normal.   Neck: Phonation normal. Neck supple. No tracheal tenderness present. No tracheal deviation present.   Pulmonary/Chest: Effort normal and breath sounds normal. No respiratory distress.   Musculoskeletal: He exhibits no edema or deformity.   Lymphadenopathy:     He has no cervical adenopathy.   Neurological: He is alert and oriented to person, place, and time. He has normal strength. No cranial nerve deficit. Coordination normal.   Skin: Skin is warm and dry. Lesion noted.   Right lateral brow with 7 mm erythematous, rough lesion c/w AK  Right temple with 2 mm rough, scaling lesion c/w AK   Psychiatric: He has a normal mood and affect. His behavior is normal. Judgment and thought content normal.   Vitals reviewed.                Assessment:  1. Actinic keratosis    2. Chronic anticoagulation    3. BMI 32.0-32.9,adult        Plan:    I have discussed treatment of actinic keratoses including observation vs cryotherapy vs topical chemotherapy. He has elected for cryotherapy today- see procedure note. We will follow-up with him in 4 months. If lesions persist on follow-up, we will consider biopsy. Call for problems or worsening symptoms.     QUALITY MEASURES    Body Mass Index Screening and Follow-Up Plan  Body mass index is 32.49 kg/m².  Patient's Body mass index is 32.49 kg/m². BMI is above normal parameters. Recommendations include: educational material.    Tobacco Use: Screening and Cessation Intervention  Smoking status: Former Smoker                                                               Packs/day: 0.00      Years: 0.00         Quit date: 1978  Smokeless tobacco: Never Used                            Return in about 4 months (around 11/2/2018), or if symptoms worsen or fail to improve, for Recheck.      Ignacia Morgan, APRN   07/02/2018  1:58 PM

## 2018-07-02 NOTE — PROGRESS NOTES
Procedure   Procedures    Preoperative Diagnosis: 1.) Actinic keratosis of right lateral brow                                         2.) Actinic keratosis of right temple    Postoperative Diagnosis: Same    Procedure: 1.) Destruction with Cryotherapy                     2.) Destruction with Cryotherapy     Provider: MANJULA Serrano    Anesthesia: None    Location: [place] [facility] office    Complications: None    Details of Procedure:     Patient identity was verified and consent was signed. Lesion #1 was treated with 2 pulses of 6 second duration each; allowing the skin to completely thaw between pulses. Lesion #2 was treated with 2 pulses of 6 second duration each; allowing the skin to completely thaw between pulses. The patient tolerated the procedure without complication.    MANJULA Nino  07/02/18  2:29 PM

## 2018-07-02 NOTE — PATIENT INSTRUCTIONS
###### BMI  #####   MyPlate from USDA  The general, healthful diet is based on the 2010 Dietary Guidelines for Americans. The amount of food you need to eat from each food group depends on your age, sex, and level of physical activity and can be individualized by a dietitian. Go to ChooseMyPlate.gov for more information.  What do I need to know about the MyPlate plan?  · Enjoy your food, but eat less.  · Avoid oversized portions.  ? ½ of your plate should include fruits and vegetables.  ? ¼ of your plate should be grains.  ? ¼ of your plate should be protein.  Grains  · Make at least half of your grains whole grains.  · For a 2,000 calorie daily food plan, eat 6 oz every day.  · 1 oz is about 1 slice bread, 1 cup cereal, or ½ cup cooked rice, cereal, or pasta.  Vegetables  · Make half your plate fruits and vegetables.  · For a 2,000 calorie daily food plan, eat 2½ cups every day.  · 1 cup is about 1 cup raw or cooked vegetables or vegetable juice or 2 cups raw leafy greens.  Fruits  · Make half your plate fruits and vegetables.  · For a 2,000 calorie daily food plan, eat 2 cups every day.  · 1 cup is about 1 cup fruit or 100% fruit juice or ½ cup dried fruit.  Protein  · For a 2,000 calorie daily food plan, eat 5½ oz every day.  · 1 oz is about 1 oz meat, poultry, or fish, ¼ cup cooked beans, 1 egg, 1 Tbsp peanut butter, or ½ oz nuts or seeds.  Dairy  · Switch to fat-free or low-fat (1%) milk.  · For a 2,000 calorie daily food plan, eat 3 cups every day.  · 1 cup is about 1 cup milk or yogurt or soy milk (soy beverage), 1½ oz natural cheese, or 2 oz processed cheese.  Fats, Oils, and Empty Calories  · Only small amounts of oils are recommended.  · Empty calories are calories from solid fats or added sugars.  · Compare sodium in foods like soup, bread, and frozen meals. Choose the foods with lower numbers.  · Drink water instead of sugary drinks.  What foods can I eat?  Grains  Whole grains such as whole wheat,  quinoa, millet, and bulgur. Bread, rolls, and pasta made from whole grains. Brown or wild rice. Hot or cold cereals made from whole grains and without added sugar.  Vegetables  All fresh vegetables, especially fresh red, dark green, or orange vegetables. Peas and beans. Low-sodium frozen or canned vegetables prepared without added salt. Low-sodium vegetable juices.  Fruits  All fresh, frozen, and dried fruits. Canned fruit packed in water or fruit juice without added sugar. Fruit juices without added sugar.  Meats and Other Protein Sources  Boiled, baked, or grilled lean meat trimmed of fat. Skinless poultry. Fresh seafood and shellfish. Canned seafood packed in water. Unsalted nuts and unsalted nut butters. Tofu. Dried beans and pea. Eggs.  Dairy  Low-fat or fat-free milk, yogurt, and cheeses.  Sweets and Desserts  Frozen desserts made from low-fat milk.  Fats and Oils  Olive, peanut, and canola oils and margarine. Salad dressing and mayonnaise made from these oils.  Other  Soups and casseroles made from allowed ingredients and without added fat or salt.  The items listed above may not be a complete list of recommended foods or beverages. Contact your dietitian for more options.  What foods are not recommended?  Grains  Sweetened, low-fiber cereals. Packaged baked goods. Snack crackers and chips. Cheese crackers, butter crackers, and biscuits. Frozen waffles, sweet breads, doughnuts, pastries, packaged baking mixes, pancakes, cakes, and cookies.  Vegetables  Regular canned or frozen vegetables or vegetables prepared with salt. Canned tomatoes. Canned tomato sauce. Fried vegetables. Vegetables in cream sauce or cheese sauce.  Fruits  Fruits packed in syrup or made with added sugar.  Meats and Other Protein Sources  Marbled or fatty meats such as ribs. Poultry with skin. Fried meats, poultry, eggs, or fish. Sausages, hot dogs, and deli meats such as pastrami, bologna, or salami.  Dairy  Whole milk, cream, cheeses  made from whole milk, sour cream. Ice cream or yogurt made from whole milk or with added sugar.  Beverages  For adults, no more than one alcoholic drink per day. Regular soft drinks or other sugary beverages. Juice drinks.  Sweets and Desserts  Sugary or fatty desserts, candy, and other sweets.  Fats and Oils  Solid shortening or partially hydrogenated oils. Solid margarine. Margarine that contains trans fats. Butter.  The items listed above may not be a complete list of foods and beverages to avoid. Contact your dietitian for more information.  This information is not intended to replace advice given to you by your health care provider. Make sure you discuss any questions you have with your health care provider.  Document Released: 01/06/2009 Document Revised: 05/25/2017 Document Reviewed: 11/26/2014  "[employer] Interactive Patient Education © 2018 "[employer] Inc.     Calorie Counting for Weight Loss  Calories are units of energy. Your body needs a certain amount of calories from food to keep you going throughout the day. When you eat more calories than your body needs, your body stores the extra calories as fat. When you eat fewer calories than your body needs, your body burns fat to get the energy it needs.  Calorie counting means keeping track of how many calories you eat and drink each day. Calorie counting can be helpful if you need to lose weight. If you make sure to eat fewer calories than your body needs, you should lose weight. Ask your health care provider what a healthy weight is for you.  For calorie counting to work, you will need to eat the right number of calories in a day in order to lose a healthy amount of weight per week. A dietitian can help you determine how many calories you need in a day and will give you suggestions on how to reach your calorie goal.  · A healthy amount of weight to lose per week is usually 1-2 lb (0.5-0.9 kg). This usually means that your daily calorie intake should be reduced  by 500-750 calories.  · Eating 1,200 - 1,500 calories per day can help most women lose weight.  · Eating 1,500 - 1,800 calories per day can help most men lose weight.    What is my plan?  My goal is to have __________ calories per day.  If I have this many calories per day, I should lose around __________ pounds per week.  What do I need to know about calorie counting?  In order to meet your daily calorie goal, you will need to:  · Find out how many calories are in each food you would like to eat. Try to do this before you eat.  · Decide how much of the food you plan to eat.  · Write down what you ate and how many calories it had. Doing this is called keeping a food log.    To successfully lose weight, it is important to balance calorie counting with a healthy lifestyle that includes regular activity. Aim for 150 minutes of moderate exercise (such as walking) or 75 minutes of vigorous exercise (such as running) each week.  Where do I find calorie information?    The number of calories in a food can be found on a Nutrition Facts label. If a food does not have a Nutrition Facts label, try to look up the calories online or ask your dietitian for help.  Remember that calories are listed per serving. If you choose to have more than one serving of a food, you will have to multiply the calories per serving by the amount of servings you plan to eat. For example, the label on a package of bread might say that a serving size is 1 slice and that there are 90 calories in a serving. If you eat 1 slice, you will have eaten 90 calories. If you eat 2 slices, you will have eaten 180 calories.  How do I keep a food log?  Immediately after each meal, record the following information in your food log:  · What you ate. Don't forget to include toppings, sauces, and other extras on the food.  · How much you ate. This can be measured in cups, ounces, or number of items.  · How many calories each food and drink had.  · The total number of  "calories in the meal.    Keep your food log near you, such as in a small notebook in your pocket, or use a mobile cait or website. Some programs will calculate calories for you and show you how many calories you have left for the day to meet your goal.  What are some calorie counting tips?  · Use your calories on foods and drinks that will fill you up and not leave you hungry:  ? Some examples of foods that fill you up are nuts and nut butters, vegetables, lean proteins, and high-fiber foods like whole grains. High-fiber foods are foods with more than 5 g fiber per serving.  ? Drinks such as sodas, specialty coffee drinks, alcohol, and juices have a lot of calories, yet do not fill you up.  · Eat nutritious foods and avoid empty calories. Empty calories are calories you get from foods or beverages that do not have many vitamins or protein, such as candy, sweets, and soda. It is better to have a nutritious high-calorie food (such as an avocado) than a food with few nutrients (such as a bag of chips).  · Know how many calories are in the foods you eat most often. This will help you calculate calorie counts faster.  · Pay attention to calories in drinks. Low-calorie drinks include water and unsweetened drinks.  · Pay attention to nutrition labels for \"low fat\" or \"fat free\" foods. These foods sometimes have the same amount of calories or more calories than the full fat versions. They also often have added sugar, starch, or salt, to make up for flavor that was removed with the fat.  · Find a way of tracking calories that works for you. Get creative. Try different apps or programs if writing down calories does not work for you.  What are some portion control tips?  · Know how many calories are in a serving. This will help you know how many servings of a certain food you can have.  · Use a measuring cup to measure serving sizes. You could also try weighing out portions on a kitchen scale. With time, you will be able to " estimate serving sizes for some foods.  · Take some time to put servings of different foods on your favorite plates, bowls, and cups so you know what a serving looks like.  · Try not to eat straight from a bag or box. Doing this can lead to overeating. Put the amount you would like to eat in a cup or on a plate to make sure you are eating the right portion.  · Use smaller plates, glasses, and bowls to prevent overeating.  · Try not to multitask (for example, watch TV or use your computer) while eating. If it is time to eat, sit down at a table and enjoy your food. This will help you to know when you are full. It will also help you to be aware of what you are eating and how much you are eating.  What are tips for following this plan?  Reading food labels  · Check the calorie count compared to the serving size. The serving size may be smaller than what you are used to eating.  · Check the source of the calories. Make sure the food you are eating is high in vitamins and protein and low in saturated and trans fats.  Shopping  · Read nutrition labels while you shop. This will help you make healthy decisions before you decide to purchase your food.  · Make a grocery list and stick to it.  Cooking  · Try to cook your favorite foods in a healthier way. For example, try baking instead of frying.  · Use low-fat dairy products.  Meal planning  · Use more fruits and vegetables. Half of your plate should be fruits and vegetables.  · Include lean proteins like poultry and fish.  How do I count calories when eating out?  · Ask for smaller portion sizes.  · Consider sharing an entree and sides instead of getting your own entree.  · If you get your own entree, eat only half. Ask for a box at the beginning of your meal and put the rest of your entree in it so you are not tempted to eat it.  · If calories are listed on the menu, choose the lower calorie options.  · Choose dishes that include vegetables, fruits, whole grains, low-fat  dairy products, and lean protein.  · Choose items that are boiled, broiled, grilled, or steamed. Stay away from items that are buttered, battered, fried, or served with cream sauce. Items labeled “crispy” are usually fried, unless stated otherwise.  · Choose water, low-fat milk, unsweetened iced tea, or other drinks without added sugar. If you want an alcoholic beverage, choose a lower calorie option such as a glass of wine or light beer.  · Ask for dressings, sauces, and syrups on the side. These are usually high in calories, so you should limit the amount you eat.  · If you want a salad, choose a garden salad and ask for grilled meats. Avoid extra toppings like skelton, cheese, or fried items. Ask for the dressing on the side, or ask for olive oil and vinegar or lemon to use as dressing.  · Estimate how many servings of a food you are given. For example, a serving of cooked rice is ½ cup or about the size of half a baseball. Knowing serving sizes will help you be aware of how much food you are eating at restaurants. The list below tells you how big or small some common portion sizes are based on everyday objects:  ? 1 oz--4 stacked dice.  ? 3 oz--1 deck of cards.  ? 1 tsp--1 die.  ? 1 Tbsp--½ a ping-pong ball.  ? 2 Tbsp--1 ping-pong ball.  ? ½ cup--½ baseball.  ? 1 cup--1 baseball.  Summary  · Calorie counting means keeping track of how many calories you eat and drink each day. If you eat fewer calories than your body needs, you should lose weight.  · A healthy amount of weight to lose per week is usually 1-2 lb (0.5-0.9 kg). This usually means reducing your daily calorie intake by 500-750 calories.  · The number of calories in a food can be found on a Nutrition Facts label. If a food does not have a Nutrition Facts label, try to look up the calories online or ask your dietitian for help.  · Use your calories on foods and drinks that will fill you up, and not on foods and drinks that will leave you hungry.  · Use  smaller plates, glasses, and bowls to prevent overeating.  This information is not intended to replace advice given to you by your health care provider. Make sure you discuss any questions you have with your health care provider.  Document Released: 12/18/2006 Document Revised: 11/17/2017 Document Reviewed: 11/17/2017  [employer] Interactive Patient Education © 2018 [employer]     Exercising to Lose Weight  Exercising can help you to lose weight. In order to lose weight through exercise, you need to do vigorous-intensity exercise. You can tell that you are exercising with vigorous intensity if you are breathing very hard and fast and cannot hold a conversation while exercising.  Moderate-intensity exercise helps to maintain your current weight. You can tell that you are exercising at a moderate level if you have a higher heart rate and faster breathing, but you are still able to hold a conversation.  How often should I exercise?  Choose an activity that you enjoy and set realistic goals. Your health care provider can help you to make an activity plan that works for you. Exercise regularly as directed by your health care provider. This may include:  · Doing resistance training twice each week, such as:  ? Push-ups.  ? Sit-ups.  ? Lifting weights.  ? Using resistance bands.  · Doing a given intensity of exercise for a given amount of time. Choose from these options:  ? 150 minutes of moderate-intensity exercise every week.  ? 75 minutes of vigorous-intensity exercise every week.  ? A mix of moderate-intensity and vigorous-intensity exercise every week.    Children, pregnant women, people who are out of shape, people who are overweight, and older adults may need to consult a health care provider for individual recommendations. If you have any sort of medical condition, be sure to consult your health care provider before starting a new exercise program.  What are some activities that can help me to lose  weight?  · Walking at a rate of at least 4.5 miles an hour.  · Jogging or running at a rate of 5 miles per hour.  · Biking at a rate of at least 10 miles per hour.  · Lap swimming.  · Roller-skating or in-line skating.  · Cross-country skiing.  · Vigorous competitive sports, such as football, basketball, and soccer.  · Jumping rope.  · Aerobic dancing.  How can I be more active in my day-to-day activities?  · Use the stairs instead of the elevator.  · Take a walk during your lunch break.  · If you drive, park your car farther away from work or school.  · If you take public transportation, get off one stop early and walk the rest of the way.  · Make all of your phone calls while standing up and walking around.  · Get up, stretch, and walk around every 30 minutes throughout the day.  What guidelines should I follow while exercising?  · Do not exercise so much that you hurt yourself, feel dizzy, or get very short of breath.  · Consult your health care provider prior to starting a new exercise program.  · Wear comfortable clothes and shoes with good support.  · Drink plenty of water while you exercise to prevent dehydration or heat stroke. Body water is lost during exercise and must be replaced.  · Work out until you breathe faster and your heart beats faster.  This information is not intended to replace advice given to you by your health care provider. Make sure you discuss any questions you have with your health care provider.  Document Released: 01/20/2012 Document Revised: 05/25/2017 Document Reviewed: 05/21/2015  [employer] Interactive Patient Education © 2018 [employer]

## 2018-08-02 NOTE — PROGRESS NOTES
Mr. Butler is 78 y.o. male    Chief Complaint   Patient presents with   • Benign Prostatic Hypertrophy   • Difficulty Urinating       Benign Prostatic Hypertrophy   This is a new problem. The current episode started more than 1 month ago. The problem has been rapidly improving since onset. Irritative symptoms do not include urgency. Pertinent negatives include no chills or hematuria. AUA score is 8-19. Nothing aggravates the symptoms. Past treatments include tamsulosin. The treatment provided significant relief. He has been using treatment for 1 to 6 months.       The following portions of the patient's history were reviewed and updated as appropriate: allergies, current medications, past family history, past medical history, past social history, past surgical history and problem list.    Review of Systems   Constitutional: Negative for chills and fever.   Gastrointestinal: Negative for abdominal pain, anal bleeding and blood in stool.   Genitourinary: Negative for decreased urine volume, flank pain, hematuria and urgency.         Current Outpatient Prescriptions:   •  atorvastatin (LIPITOR) 20 MG tablet, Take 20 mg by mouth Daily., Disp: , Rfl:   •  digoxin (LANOXIN) 125 MCG tablet, Take 125 mcg by mouth Daily., Disp: , Rfl:   •  famotidine (PEPCID) 20 MG tablet, Take 20 mg by mouth Daily., Disp: , Rfl:   •  furosemide (LASIX) 80 MG tablet, Take 40 mg by mouth 2 (Two) Times a Day., Disp: , Rfl:   •  gabapentin (NEURONTIN) 100 MG capsule, Take 100 mg by mouth 3 (Three) Times a Day., Disp: , Rfl:   •  glipiZIDE (GLUCOTROL) 10 MG tablet, Take 10 mg by mouth 2 (Two) Times a Day Before Meals., Disp: , Rfl:   •  LEVOTHYROXINE SODIUM PO, Take 112 mcg by mouth Daily., Disp: , Rfl:   •  metoprolol tartrate (LOPRESSOR) 100 MG tablet, Take 100 mg by mouth 2 (Two) Times a Day., Disp: , Rfl:   •  nitroglycerin (NITROSTAT) 0.4 MG SL tablet, 1 under the tongue as needed for angina, may repeat q5mins for up three doses, Disp: 30  tablet, Rfl: 0  •  tamsulosin (FLOMAX) 0.4 MG capsule 24 hr capsule, Take 1 capsule by mouth Daily., Disp: 30 capsule, Rfl: 11  •  warfarin (COUMADIN) 5 MG tablet, Take 5 mg by mouth Daily. MWF- 7mg Dr. Joyce regulates, Disp: , Rfl:     Past Medical History:   Diagnosis Date   • Abnormal glucose     nec   • AF (atrial fibrillation) (CMS/HCC)    • Aortic stenosis    • Aortic valve replaced    • Arthritis    • CAD in native artery    • Carotid artery occlusion    • Chest pain    • CHF (congestive heart failure) (CMS/HCC)    • Diabetes mellitus type 2, uncomplicated (CMS/HCC)    • Disease of thyroid gland    • Esophageal reflux    • Exposure to asbestos     hx personal exposure to asbestos   • Hyperlipidemia    • Hypertension    • Mitral stenosis    • Murmur    • Neoplasm of uncertain behavior    • Obesity     nos   • Renal artery stenosis (CMS/HCC)    • Sleep apnea, obstructive     see sleep study 7/12 - intolerant to multiple masks/Bipap   • SOB (shortness of breath)        Past Surgical History:   Procedure Laterality Date   • ADENOIDECTOMY     • BREAST SURGERY      benign tumor left breast   • BYPASS GRAFT  06/16/2009    aortocoronary bypass x1   • CARDIAC CATHETERIZATION N/A 11/29/2017    Procedure: Left Heart Cath;  Surgeon: Yandel Nguyen MD;  Location:  [facility] CATH INVASIVE LOCATION;  Service:    • CARDIAC CATHETERIZATION N/A 11/29/2017    Procedure: Right Heart Cath;  Surgeon: Yandel Nguyen MD;  Location:  [facility]ATH INV[facility];  Service:    • CARDIAC CATHETERIZATION N/A 11/29/2017    Procedure: Bypass graft study;  Surgeon: Yandel Nguyen MD;  Location:  [facility]AS[facility];  Service:    • CARDIAC CATHETERIZATION N/A 11/29/2017    Procedure: Angioplasty-bypass graft;  Surgeon: Yandel Nguyen MD;  Location:  [facility]ATH INVASIVE [facility];  Service:    • CARDIAC CATHETERIZATION  11/29/2017    Procedure: Percutaneous Manual Thrombectomy;  Surgeon: Yandel Nguyen MD;  Location:  [facility] CATH INVASIVE  "LOCATION;  Service:    • CARDIAC CATHETERIZATION N/A 1/12/2018    Procedure: MItral valvuloplasty;  Surgeon: Rodrigo Charlton MD;  Location: [facility] CATH INVASIVE LOCATION;  Service:    • CARDIAC VALVE REPLACEMENT  06/16/2009    transplant, heart valve: aortiv valve   • CAROTID ARTERY ANGIOPLASTY Right    • COLONOSCOPY  06/04/2015    diverticulosis left colon  hemorrhoids   • CORONARY ANGIOPLASTY     • CORONARY ARTERY BYPASS GRAFT     • CORONARY STENT PLACEMENT     • ENDOSCOPY  10/08/2014    stable 's   • ENDOSCOPY N/A 11/1/2017    Procedure: ESOPHAGOGASTRODUODENOSCOPY WITH ANESTHESIA;  Surgeon: Arun Smallwood DO;  Location: [facility] ENDOSCOPY;  Service:    • HERNIA REPAIR     • TONSILLECTOMY         Social History     Social History   • Marital status:      Social History Main Topics   • Smoking status: Former Smoker     Quit date: 1978   • Smokeless tobacco: Never Used   • Alcohol use Yes      Comment: occ.   • Drug use: No   • Sexual activity: Defer     Other Topics Concern   • Not on file       Family History   Problem Relation Age of Onset   • Coronary artery disease Other    • Diabetes Other    • Hypertension Other    • Clotting disorder Mother    • Brain cancer Father    • No Known Problems Sister    • Cancer Brother    • Heart disease Maternal Grandfather    • Diabetes Maternal Grandfather    • Heart disease Sister    • Hypertension Sister    • Alzheimer's disease Sister    • Heart disease Sister    • Heart failure Sister    • Heart attack Sister    • Colon cancer Neg Hx    • Esophageal cancer Neg Hx        Objective    /68 (BP Location: Right arm, Patient Position: Sitting, Cuff Size: Adult)   Temp 96.6 °F (35.9 °C)   Ht 175.3 cm (69\")   Wt 102 kg (223 lb 12.8 oz)   BMI 33.05 kg/m²     Physical Exam    Office Visit on 06/21/2018   Component Date Value Ref Range Status   • Color 06/21/2018 Yellow  Yellow, Straw, Dark Yellow, Shona Final   • Clarity, UA 06/21/2018 Clear  Clear " Final   • Glucose, UA 06/21/2018 Negative  Negative, 1000 mg/dL (3+) mg/dL Final   • Bilirubin 06/21/2018 Negative  Negative Final   • Ketones, UA 06/21/2018 Negative  Negative Final   • Specific Gravity  06/21/2018 1.015  1.005 - 1.030 Final   • Blood, UA 06/21/2018 Trace* Negative Final   • pH, Urine 06/21/2018 6.0  5.0 - 8.0 Final   • Protein, POC 06/21/2018 100 mg/dL* Negative mg/dL Final   • Urobilinogen, UA 06/21/2018 4 E.U./dL * Normal Final   • Nitrite, UA 06/21/2018 Negative  Negative Final   • Leukocytes 06/21/2018 Negative  Negative Final   • Urine Culture 06/21/2018 No growth at 2 days   Final       Results for orders placed or performed in visit on 06/21/18   Urine Culture - Urine, Urine, Clean Catch   Result Value Ref Range    Urine Culture No growth at 2 days    POC Urinalysis Dipstick, Multipro   Result Value Ref Range    Color Yellow Yellow, Straw, Dark Yellow, Shona    Clarity, UA Clear Clear    Glucose, UA Negative Negative, 1000 mg/dL (3+) mg/dL    Bilirubin Negative Negative    Ketones, UA Negative Negative    Specific Gravity  1.015 1.005 - 1.030    Blood, UA Trace (A) Negative    pH, Urine 6.0 5.0 - 8.0    Protein,  mg/dL (A) Negative mg/dL    Urobilinogen, UA 4 E.U./dL  (A) Normal    Nitrite, UA Negative Negative    Leukocytes Negative Negative     Estimation of residual urine via abdominal ultrasound  Residual Urine: 79 ml  Indication: difficulty urinating  Position: Supine  Examination: Incremental scanning of the suprapubic area using 3 MHz transducer using copious amounts of acoustic gel.   Findings: An anechoic area was demonstrated which represented the bladder, with measurement of residual urine as noted. I inspected this myself. In that the residual urine was stable or insignificant, no treatment will be necessary at this time.         Assessment and Plan    Ray was seen today for benign prostatic hypertrophy and difficulty urinating.    Diagnoses and all orders for this  visit:    Dysuria    Benign prostatic hyperplasia with weak urinary stream    Since her last visit, the results of his urine culture were negative.  Dysuria not secondary to infection.  The Flomax has helped significantly with the dysuria, I do believe this is likely secondary to enlarged prostate.  Patient is also notices significant improvement in his stream and other urinary symptoms.  He is overall very happy with how this is working.  Bladder scan today shows he is emptying his bladder appropriately with approximately 2 ounces (I will see him back in approximately 6 months.  If his symptoms are stable we can move out to yearly, and this can be done with his primary care physician.

## 2018-09-20 PROBLEM — I10 ESSENTIAL HYPERTENSION: Status: ACTIVE | Noted: 2018-01-01

## 2018-09-20 PROBLEM — E78.2 MIXED HYPERLIPIDEMIA: Status: ACTIVE | Noted: 2018-01-01

## 2018-09-20 PROBLEM — E66.09 CLASS 1 OBESITY DUE TO EXCESS CALORIES WITH SERIOUS COMORBIDITY AND BODY MASS INDEX (BMI) OF 33.0 TO 33.9 IN ADULT: Status: ACTIVE | Noted: 2018-01-01

## 2018-09-20 NOTE — PROGRESS NOTES
"    Subjective:     Encounter Date:09/20/2018      Patient ID: Yovani Butler is a 78 y.o. male. He presents today for three month follow up of severe mitral valve stenosis s/p valvuloplasty in 01/18. He has a history of coronary artery disease s/p coronary artery bypass grafting and aortic valve replacement remotely, chronic atrial fibrillation on chronic anticoagulation, pulmonary hypertension, hypertension, hyperlipidemia and obesity. He complains of progressively worsening exertional chest tightness and dyspnea, weakness and dizziness since his valvuloplasty in 01/8. Pt has been referred to CT surgery, however pt has not been evaluated for surgery at this time. He denies palpitations, syncope, orthopnea or PND. He states \"I can't go on like this much longer, I can't even walk to the mailbox without having to stop. I can't take care of my yard or anything else anymore.\"    Chief Complaint: chest tightness, dyspnea on exertion, dizziness, weakness  Severe Mitral Valve Stenosis      Coronary Artery Disease   Presents for follow-up visit. Symptoms include chest pain, chest pressure, dizziness and shortness of breath. Pertinent negatives include no chest tightness, leg swelling, muscle weakness, palpitations or weight gain. Risk factors do not include hypertension. The symptoms have been stable. Compliance with diet is variable. Compliance with exercise is variable. Compliance with medications is good.   Atrial Fibrillation   Presents for follow-up visit. Symptoms include chest pain, dizziness, shortness of breath and weakness. Symptoms are negative for an AICD problem, bradycardia, hemodynamic instability, hypertension, hypotension, pacemaker problem, palpitations, syncope and tachycardia. The symptoms have been stable. Past medical history includes atrial fibrillation and CAD.       The following portions of the patient's history were reviewed and updated as appropriate: allergies, current medications, past family " history, past medical history, past social history, past surgical history and problem list.     No Known Allergies    Current Outpatient Prescriptions:   •  atorvastatin (LIPITOR) 20 MG tablet, Take 20 mg by mouth Daily., Disp: , Rfl:   •  digoxin (LANOXIN) 125 MCG tablet, Take 125 mcg by mouth Daily., Disp: , Rfl:   •  famotidine (PEPCID) 20 MG tablet, Take 20 mg by mouth Daily., Disp: , Rfl:   •  furosemide (LASIX) 80 MG tablet, Take 40 mg by mouth 2 (Two) Times a Day., Disp: , Rfl:   •  glipiZIDE (GLUCOTROL) 10 MG tablet, Take 10 mg by mouth 2 (Two) Times a Day Before Meals., Disp: , Rfl:   •  LEVOTHYROXINE SODIUM PO, Take 112 mcg by mouth Daily., Disp: , Rfl:   •  metoprolol tartrate (LOPRESSOR) 100 MG tablet, Take 100 mg by mouth 2 (Two) Times a Day., Disp: , Rfl:   •  nitroglycerin (NITROSTAT) 0.4 MG SL tablet, 1 under the tongue as needed for angina, may repeat q5mins for up three doses, Disp: 30 tablet, Rfl: 0  •  tamsulosin (FLOMAX) 0.4 MG capsule 24 hr capsule, Take 1 capsule by mouth Daily., Disp: 30 capsule, Rfl: 11  •  warfarin (COUMADIN) 5 MG tablet, Take 5 mg by mouth Daily. MWF- 7mg Dr. Joyce regulates, Disp: , Rfl:   Past Medical History:   Diagnosis Date   • Abnormal glucose     nec   • AF (atrial fibrillation) (CMS/HCC)    • Aortic stenosis    • Aortic valve replaced    • Arthritis    • CAD in native artery    • Carotid artery occlusion    • Chest pain    • CHF (congestive heart failure) (CMS/HCC)    • Diabetes mellitus type 2, uncomplicated (CMS/HCC)    • Disease of thyroid gland    • Esophageal reflux    • Exposure to asbestos     hx personal exposure to asbestos   • Hyperlipidemia    • Hypertension    • Mitral stenosis    • Murmur    • Neoplasm of uncertain behavior    • Obesity     nos   • Renal artery stenosis (CMS/HCC)    • Sleep apnea, obstructive     see sleep study 7/12 - intolerant to multiple masks/Bipap   • SOB (shortness of breath)      Past Surgical History:   Procedure Laterality  Date   • ADENOIDECTOMY     • BREAST SURGERY      benign tumor left breast   • BYPASS GRAFT  06/16/2009    aortocoronary bypass x1   • CARDIAC CATHETERIZATION N/A 11/29/2017    Procedure: Left Heart Cath;  Surgeon: Yandel Nguyen MD;  Location:  [facility] CATH INVASIVE LOCATION;  Service:    • CARDIAC CATHETERIZATION N/A 11/29/2017    Procedure: Right Heart Cath;  Surgeon: Yandel Nguyen MD;  Location:  [facility] CATH INVAS[facility] LOC[facility];  Service:    • CARDIAC CATHETERIZATION N/A 11/29/2017    Procedure: Bypass graft study;  Surgeon: Yandel Nguyen MD;  Location:  [facility] CATH INVASIVE LOCATION;  Service:    • CARDIAC CATHETERIZATION N/A 11/29/2017    Procedure: Angioplasty-bypass graft;  Surgeon: Yandel Nguyen MD;  Location:  [facility] C[facility] INVAS[facility] LOC[facility];  Service:    • CARDIAC CATHETERIZATION  11/29/2017    Procedure: Percutaneous Manual Thrombectomy;  Surgeon: Yandel Nguyen MD;  Location:  [facility] CATH INVAS[facility] LOC[facility];  Service:    • CARDIAC CATHETERIZATION N/A 1/12/2018    Procedure: MItral valvuloplasty;  Surgeon: Rodrigo Charlton MD;  Location:  [facility] CATH INVASIVE LOCATION;  Service:    • CARDIAC VALVE REPLACEMENT  06/16/2009    transplant, heart valve: aortiv valve   • CAROTID ARTERY ANGIOPLASTY Right    • COLONOSCOPY  06/04/2015    diverticulosis left colon  hemorrhoids   • CORONARY ANGIOPLASTY     • CORONARY ARTERY BYPASS GRAFT     • CORONARY STENT PLACEMENT     • ENDOSCOPY  10/08/2014    stable 's   • ENDOSCOPY N/A 11/1/2017    Procedure: ESOPHAGOGASTRODUODENOSCOPY WITH ANESTHESIA;  Surgeon: Arun Smallwood DO;  Location: [facility] ENDOSCOPY;  Service:    • HERNIA REPAIR     • TONSILLECTOMY       Family History   Problem Relation Age of Onset   • Coronary artery disease Other    • Diabetes Other    • Hypertension Other    • Clotting disorder Mother    • Brain cancer Father    • No Known Problems Sister    • Cancer Brother    • Heart disease Maternal Grandfather    • Diabetes Maternal Grandfather    • Heart  disease Sister    • Hypertension Sister    • Alzheimer's disease Sister    • Heart disease Sister    • Heart failure Sister    • Heart attack Sister    • Colon cancer Neg Hx    • Esophageal cancer Neg Hx      Social History     Social History   • Marital status:      Spouse name: N/A   • Number of children: N/A   • Years of education: N/A     Occupational History   • Not on file.     Social History Main Topics   • Smoking status: Former Smoker     Quit date: 1978   • Smokeless tobacco: Never Used   • Alcohol use Yes      Comment: occ.   • Drug use: No   • Sexual activity: Defer     Other Topics Concern   • Not on file     Social History Narrative   • No narrative on file         Review of Systems   Constitution: Positive for weakness. Negative for chills, decreased appetite, fever, malaise/fatigue, night sweats, weight gain and weight loss.   HENT: Negative for nosebleeds.    Eyes: Negative for visual disturbance.   Cardiovascular: Positive for chest pain and dyspnea on exertion. Negative for leg swelling, near-syncope, orthopnea, palpitations, paroxysmal nocturnal dyspnea and syncope.   Respiratory: Positive for shortness of breath. Negative for chest tightness, cough, hemoptysis, snoring and wheezing.    Endocrine: Negative for cold intolerance and heat intolerance.   Hematologic/Lymphatic: Does not bruise/bleed easily.   Skin: Negative for rash.   Musculoskeletal: Negative for back pain, falls and muscle weakness.   Gastrointestinal: Negative for abdominal pain, change in bowel habit, constipation, diarrhea, dysphagia, heartburn, nausea and vomiting.   Genitourinary: Negative for hematuria.   Neurological: Positive for dizziness. Negative for headaches and light-headedness.   Psychiatric/Behavioral: Negative for altered mental status.   Allergic/Immunologic: Negative for persistent infections.         ECG 12 Lead  Date/Time: 9/20/2018 10:37 AM  Performed by: REYNA ZAZUETA  Authorized by: REYNA ZAZUETA  "  Comparison: compared with previous ECG from 6/18/2018  Similar to previous ECG  Rhythm: atrial fibrillation  BPM: 61  Clinical impression: abnormal ECG        /80   Pulse 61   Ht 175.3 cm (69\")   Wt 103 kg (226 lb)   BMI 33.37 kg/m²          Objective:     Physical Exam   Constitutional: He is oriented to person, place, and time. Vital signs are normal. He appears well-developed and well-nourished. No distress.   HENT:   Head: Normocephalic and atraumatic.   Right Ear: External ear normal.   Left Ear: External ear normal.   Eyes: Pupils are equal, round, and reactive to light. Conjunctivae are normal. Right eye exhibits no discharge. Left eye exhibits no discharge.   Neck: Normal range of motion. Neck supple. No JVD present. Carotid bruit is not present. No thyromegaly present.   Cardiovascular: Normal rate and intact distal pulses.  An irregularly irregular rhythm present. PMI is not displaced.  Exam reveals no gallop, no friction rub and no decreased pulses.    Murmur heard.   Harsh midsystolic murmur is present with a grade of 2/6  at the upper right sternal border radiating to the neck   Low-pitched rumbling crescendo presystolic murmur is present with a grade of 2/6  at the apex  Pulses:       Radial pulses are 2+ on the right side, and 2+ on the left side.        Dorsalis pedis pulses are 2+ on the right side, and 2+ on the left side.        Posterior tibial pulses are 2+ on the right side, and 2+ on the left side.   Pulmonary/Chest: Effort normal and breath sounds normal. No respiratory distress. He has no decreased breath sounds. He has no wheezes. He has no rhonchi. He has no rales. He exhibits no tenderness.   Abdominal: Soft. Bowel sounds are normal. He exhibits no distension. There is no tenderness.   Musculoskeletal: Normal range of motion. He exhibits edema (Mild bilateral lower extremity edema).   Neurological: He is alert and oriented to person, place, and time.   Skin: Skin is warm and " dry. No rash noted. He is not diaphoretic. No erythema. No pallor.   Psychiatric: He has a normal mood and affect. His behavior is normal. Judgment and thought content normal.   Vitals reviewed.      Lab Review:   Lab Results   Component Value Date    WBC 7.71 01/13/2018    HGB 10.7 (L) 01/13/2018    HCT 32.0 (L) 01/13/2018    MCV 89.4 01/13/2018     01/13/2018     Lab Results   Component Value Date    GLUCOSE 163 (H) 01/13/2018    BUN 17 01/13/2018    CREATININE 1.27 01/13/2018    EGFRIFNONA 55 (L) 01/13/2018    BCR 13.4 01/13/2018    K 3.6 01/13/2018    CO2 29.0 01/13/2018    CALCIUM 8.6 01/13/2018    ALBUMIN 4.10 01/08/2018    AST 41 01/08/2018    ALT 43 01/08/2018         Assessment:          Diagnosis Plan   1. Coronary artery disease involving coronary bypass graft of native heart without angina pectoris  Chest tightness and dyspnea on exertion likely related to severe mitral valve stenosis. Pt had heart cath 11/29/17. May require another heart cath as part of workup for valve replacement.    2. Hx of CABG  Remotely.    3. H/O aortic valve replacement  Remotely.    4. Chronic atrial fibrillation (CMS/HCC)  Rate controlled and anticoagulated.    5. Chronic anticoagulation  On coumadin. Denies bleeding.    6. Mitral valve stenosis, severe  Pt lost to follow up with CT surgery, will discuss with their office to get appointment for pt ASAP.    7. Moderate to severe pulmonary hypertension     8. Mixed hyperlipidemia  Managed by PCP. On statin.    9. Essential hypertension  Well controlled.    10. Class 1 obesity due to excess calories with serious comorbidity and body mass index (BMI) of 33.0 to 33.9 in adult  Patient's Body mass index is 33.37 kg/m². BMI is above normal parameters. Recommendations include: educational material.            Plan:       1. Continue medications as previously prescribed.  2. Report any worsening symptoms.  3. Report any signs of bleeding.  4. Continue heart healthy diet and  regular exercise as tolerated.   5. Follow up with PCP for blood pressure and cholesterol management and routine lab work.  6. Follow up with mid-level provider in one month, or sooner if needed. Will discuss with MANJULA Caputo with CT surgery regarding possibility of getting pt in to see Dr. Castro CORDOVA. If this is not possible, may consider referral to outside CT surgeon as pts condition is declining.

## 2018-09-27 NOTE — PROGRESS NOTES
Yovani Butler  1940  Stephanie Joyce MD Shakira Tait APRN/Dr. Rodrigo Charlton.    Chief Complaint   Patient presents with   • Mitral Stenosis     NP from [place]   Increasing shortness of breath, fatigue and a sterile chest pain with exertion    Present Illness: Mr. Yovani Butler is a 78-year-old  male known to the cardiovascular surgical service here.  In 2009 he had an aortic valve replacement with a bovine pericardial tissue valve and a saphenous vein graft to his left anterior descending coronary artery.  Originally postoperatively he did well but the ensuing years has had multiple heart problems.  He had occlusion of his left anterior descending graft.  He developed atrial fibrillation and is on chronic anticoagulation with Coumadin.  He has had renal artery stenosis and a stent placed for this.  He has sleep apnea, gastroesophageal reflux disease, arthritis, obesity with a BMI of 32.9, most recent hematocrit was 32% saline has an anemia.    Because of increasing dyspnea and shortness of breath he underwent left heart catheterization on 11/29/2017.  At that time his coronaries showed a normal left main coronary artery and a left circumflex and right coronary artery with no significant disease.  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  Attempts to open the left anterior descending or the graft were unsuccessful.  Right pulmonary pressures showed a right atrial pressure of 50 mmHg right ventricular pressure 93/5 mmHg, pulmonary artery pressures of 100/35 mmHg.  A woman Jason wedge pressure was 25 and calculated mitral valve area was 1.07.  The gradient at that time was 34 mmHg.Echocardiograms in the past have  shown his ejection fraction to be in the range of 51-55%.  The aortic bioprosthesis is functioning well.  He did however have severe mitral stenosis and moderate mitral  regurgitation.  On 01/02/2018 he had a mitral valve balloon alveoloplasty which reduced the stenosis to a 3 mm gradient.    However after an initial period of feeling better he has progressively gotten worse.  He now cannot walk to the mailbox and back without becoming significantly short of breath.  He does have some leg edema after he is been up all day.  He has occasional chest pains with exertion.  He would like to multiple line, working in his garden etc. and this is not possible for him at the present time.    He has a history of previous coronary artery bypass grafting and aortic valve replacement.  He has a history of hypertension, diabetes and hyperlipidemia as well as the above mentioned problems.    Past Medical History:   Diagnosis Date   • Abnormal glucose     nec   • AF (atrial fibrillation) (CMS/[facility])    • Aortic stenosis    • Aortic valve replaced    • Arthritis    • CAD in native artery    • Carotid artery occlusion    • Chest pain    • CHF (congestive heart failure) (CMS/HCC)    • Diabetes mellitus type 2, uncomplicated (CMS/HCC)    • Disease of thyroid gland    • Esophageal reflux    • Exposure to asbestos     hx personal exposure to asbestos   • Hyperlipidemia    • Hypertension    • Mitral stenosis    • Murmur    • Neoplasm of uncertain behavior    • Obesity     nos   • Renal artery stenosis (CMS/HCC)    • Sleep apnea, obstructive     see sleep study 7/12 - intolerant to multiple masks/Bipap   • SOB (shortness of breath)        Past Surgical History:   Procedure Laterality Date   • ADENOIDECTOMY     • BREAST SURGERY      benign tumor left breast   • BYPASS GRAFT  06/16/2009    aortocoronary bypass x1   • CARDIAC CATHETERIZATION N/A 11/29/2017    Procedure: Left Heart Cath;  Surgeon: Yandel Nguyen MD;  Location:  [facility];  Service:    • CARDIAC CATHETERIZATION N/A 11/29/2017    Procedure: Right Heart Cath;  Surgeon: Yandel Nguyen MD;  Location:  [facility];   Service:    • CARDIAC CATHETERIZATION N/A 11/29/2017    Procedure: Bypass graft study;  Surgeon: Yandel Nguyen MD;  Location:  [facility];  Service:    • CARDIAC CATHETERIZATION N/A 11/29/2017    Procedure: Angioplasty-bypass graft;  Surgeon: Yandel Nguyen MD;  Location:  [facility];  Service:    • CARDIAC CATHETERIZATION  11/29/2017    Procedure: Percutaneous Manual Thrombectomy;  Surgeon: Yandel Nguyen MD;  Location:  [facility];  Service:    • CARDIAC CATHETERIZATION N/A 1/12/2018    Procedure: MItral valvuloplasty;  Surgeon: Rodrigo Charlton MD;  Location:  [facility];  Service:    • CARDIAC VALVE REPLACEMENT  06/16/2009    transplant, heart valve: aortiv valve   • CAROTID ARTERY ANGIOPLASTY Right    • COLONOSCOPY  06/04/2015    diverticulosis left colon  hemorrhoids   • CORONARY ANGIOPLASTY     • CORONARY ARTERY BYPASS GRAFT     • CORONARY STENT PLACEMENT     • ENDOSCOPY  10/08/2014    stable 's   • ENDOSCOPY N/A 11/1/2017    Procedure: ESOPHAGOGASTRODUODENOSCOPY WITH ANESTHESIA;  Surgeon: Arun Smallwood DO;  Location: [facility] ENDOSCOPY;  Service:    • HERNIA REPAIR     • TONSILLECTOMY         No Known Allergies      Current Outpatient Prescriptions:   •  atorvastatin (LIPITOR) 20 MG tablet, Take 20 mg by mouth Daily., Disp: , Rfl:   •  digoxin (LANOXIN) 125 MCG tablet, Take 125 mcg by mouth Daily., Disp: , Rfl:   •  famotidine (PEPCID) 20 MG tablet, Take 20 mg by mouth Daily., Disp: , Rfl:   •  furosemide (LASIX) 80 MG tablet, Take 40 mg by mouth 2 (Two) Times a Day., Disp: , Rfl:   •  glipiZIDE (GLUCOTROL) 10 MG tablet, Take 10 mg by mouth 2 (Two) Times a Day Before Meals., Disp: , Rfl:   •  LEVOTHYROXINE SODIUM PO, Take 112 mcg by mouth Daily., Disp: , Rfl:   •  metoprolol tartrate (LOPRESSOR) 100 MG tablet, Take 100 mg by mouth 2 (Two) Times a Day., Disp: , Rfl:   •  nitroglycerin (NITROSTAT) 0.4 MG SL tablet, 1 under the  "tongue as needed for angina, may repeat q5mins for up three doses, Disp: 30 tablet, Rfl: 0  •  tamsulosin (FLOMAX) 0.4 MG capsule 24 hr capsule, Take 1 capsule by mouth Daily., Disp: 30 capsule, Rfl: 11  •  warfarin (COUMADIN) 5 MG tablet, Take 5 mg by mouth Daily. MWF- 7mg Dr. Joyce regulates, Disp: , Rfl:     Family History   Problem Relation Age of Onset   • Coronary artery disease Other    • Diabetes Other    • Hypertension Other    • Clotting disorder Mother    • Brain cancer Father    • No Known Problems Sister    • Cancer Brother    • Heart disease Maternal Grandfather    • Diabetes Maternal Grandfather    • Heart disease Sister    • Hypertension Sister    • Alzheimer's disease Sister    • Heart disease Sister    • Heart failure Sister    • Heart attack Sister    • Colon cancer Neg Hx    • Esophageal cancer Neg Hx        Social History     Social History   • Marital status:      Social History Main Topics   • Smoking status: Former Smoker     Quit date: 1978   • Smokeless tobacco: Never Used   • Alcohol use Yes      Comment: occ.   • Drug use: No   • Sexual activity: Defer     Other Topics Concern   • Not on file         Review of Systems   Constitutional: Positive for activity change and fatigue.   Respiratory: Positive for chest tightness and shortness of breath.    Cardiovascular: Positive for chest pain and leg swelling.   Musculoskeletal: Positive for arthralgias.   Neurological: Positive for dizziness.     Review of systems was checked and found to be negative    /86 (BP Location: Left arm, Patient Position: Sitting, Cuff Size: Adult)   Pulse 74   Ht 175.3 cm (69\")   Wt 101 kg (223 lb)   SpO2 96%   BMI 32.93 kg/m²     Physical Exam  General: Very pleasant elderly man who is short of breath from walking to the office but this got better during the visit.  He seems slightly depressed.  HEENT: Eyes: Pupils equal round and react to light; extraocular movements intact                Nose " mouth and throat are benign                Neck: Neck is supple without thyromegaly, mass or bruit.  Chest/Lungs: His median sternotomy incision healed but I think that his sternum is at least partially apart  Heart:: Irregular rate and rhythm with 1/6 systolic murmur at the aortic position  Abdomen: Soft, nontender without rebound guarding or mass  Extremities: 1+ edema  Pulses: 2+ bilateral and equal throughout  Integument: No open sores or rashes  Neurologic: Cranial nerves II through XII are intact.  Motor and sensation are grossly normal    Assessment: Mr. Butler is a very nice man who has significant problem with his heart.  Ideally, he could have a mitral valve replacement or repair and this might help him somewhat.  I am not certain that his pulmonary hypertension is reversible at this time.  I do not know recent heart cath but in the end of November he had only 1 blockage which was 100% in his left anterior descending coronary artery.  His left ventricular function by echo seemed to be fairly good but he has significant right ventricular difficulties.  In addition he has atrial fibrillation requiring chronic anticoagulation.  In the light of his many comorbidities especially the pulmonary hypertension which I am not sure is reversible I think a redo sternotomy and mitral valve replacement and possible redo coronary artery bypass grafting has prohibitive risk.  There might be other options at a tertiary center and we talked at length about this.  He is not anxious to have a redo operation but with reasonable risk he might consider this or some other nonoperative treatment if there was one available.  He would like to talk with a physician at a tertiary center to see what options might be available for him.  Talk with cardiology group and they will make a referral.      Plan: As above      Zenon Erazo MD  09/27/18  9:31 AM

## 2018-10-02 NOTE — TELEPHONE ENCOUNTER
Patient called stating that he was supposed to be referred to [facility] and had not heard anything from them about an appointment. Told him a message would be sent.

## 2018-10-03 NOTE — TELEPHONE ENCOUNTER
Referral has been placed by the Cardiology group. Please inform patient that he should be hearing something soon, if not, he can call cardiology group and ask about referral.

## 2018-10-03 NOTE — TELEPHONE ENCOUNTER
Judith at [facility] notified Mr. Butler he has an appt w/Dr. Pete Thomas for testing and consult on Monday, 10/8/18.

## (undated) DEVICE — INFLATION DEVICE: Brand: ENCORE™ 26

## (undated) DEVICE — ANGIO-SEAL EVOLUTION VASCULAR CLOSURE DEVICE: Brand: ANGIO-SEAL

## (undated) DEVICE — KT MANIFLD RT CUST BHPAD

## (undated) DEVICE — CANN CO2/O2 NASL A/

## (undated) DEVICE — SWAN-GANZ POLYMER BLEND TRUE SIZE C-TIP CONTROLCATH TD: Brand: SWAN-GANZ CONTROLCATH TRUE SIZE

## (undated) DEVICE — PTCA DILATATION CATHETER: Brand: EMERGE™

## (undated) DEVICE — TBG SMPL FLTR LINE NASL 02/C02 A/ BX/100

## (undated) DEVICE — IMMOB KN 3PNL DLX CANVS 19IN BLU

## (undated) DEVICE — A2000 MULTI-USE SYRINGE KIT, P/N 701277-003KIT CONTENTS: 100ML CONTRAST RESERVOIR AND TUBING WITH CONTRAST SPIKE AND CLAMP: Brand: A2000 MULTI-USE SYRINGE KIT

## (undated) DEVICE — SOLIDIFIER LIQUI LOC PLUS 2000CC

## (undated) DEVICE — GW STARTER FXD CORE J .035 3X150CM 3MM

## (undated) DEVICE — PINNACLE INTRODUCER SHEATH: Brand: PINNACLE

## (undated) DEVICE — CUFF,BP,DISP,1 TUBE,ADULT,HP: Brand: MEDLINE

## (undated) DEVICE — SYS TRNSEP ACC BRK ACROSS A/ 71CM

## (undated) DEVICE — DRSNG PRESS SAFEGUARD

## (undated) DEVICE — ENDOGATOR AUXILIARY WATER JET CONNECTOR: Brand: ENDOGATOR

## (undated) DEVICE — GW FC J TFE/COAT .018 3MM 145CM

## (undated) DEVICE — STPCK 3/WY HP M/RA W/OFF/HNDL 1050PSI STRL

## (undated) DEVICE — Device: Brand: MEDEX

## (undated) DEVICE — CATH DIAG IMPULSE M/ PK 145 5FR

## (undated) DEVICE — SOL IRR NACL 0.9PCT BT 1000ML

## (undated) DEVICE — CATH F6INF PIG 145 110CM 6SH: Brand: INFINITI

## (undated) DEVICE — PK CATH CARD 30

## (undated) DEVICE — MODEL AT P65, P/N 701554-001KIT CONTENTS: HAND CONTROLLER, 3-WAY HIGH-PRESSURE STOPCOCK WITH ROTATING END AND PREMIUM HIGH-PRESSURE TUBING: Brand: ANGIOTOUCH® KIT

## (undated) DEVICE — INTRO SHEATH FASTCATH TRNSEP SL1 .032IN 8F 63CM

## (undated) DEVICE — MODEL BT2000 P/N 700287-012KIT CONTENTS: MANIFOLD WITH SALINE AND CONTRAST PORTS, SALINE TUBING WITH SPIKE AND HAND SYRINGE, TRANSDUCER: Brand: BT2000 AUTOMATED MANIFOLD KIT

## (undated) DEVICE — PERCLOSE PROGLIDE™ SUTURE-MEDIATED CLOSURE SYSTEM: Brand: PERCLOSE PROGLIDE™

## (undated) DEVICE — THE PRONTO LP CATHETER IS INDICATED FOR THE REMOVAL OF FRESH, SOFT EMBOLI AND THROMBI FROM VESSELS IN THE CORONARY AND PERIPHERAL SYSTEM.: Brand: PRONTO® LP EXTRACTION CATHETER

## (undated) DEVICE — VLV HEMO GUARDIAN INSRT/TOOL W TORQ DEV

## (undated) DEVICE — 6F .070 LCB 100CM: Brand: VISTA BRITE TIP

## (undated) DEVICE — ELECTRD PAD DEFIB A/

## (undated) DEVICE — CATH F6INF TL AL I 100CM: Brand: INFINITI

## (undated) DEVICE — FRCP BX RADJAW4 NDL 2.8 240 STD OG

## (undated) DEVICE — BALN PRESS WEDGE 7F 110CM

## (undated) DEVICE — CONMED SCOPE SAVER BITE BLOCK, 20X27 MM: Brand: SCOPE SAVER

## (undated) DEVICE — Device: Brand: INOUE BALLOON CATHETER

## (undated) DEVICE — SENSR O2 OXIMAX FNGR A/ 18IN NONSTR

## (undated) DEVICE — Device: Brand: DEFENDO AIR/WATER/SUCTION AND BIOPSY VALVE

## (undated) DEVICE — TOTAL TRAY, DB, 100% SILI FOLEY, 16FR 10: Brand: MEDLINE

## (undated) DEVICE — THE CHANNEL CLEANING BRUSH IS A NYLON FLEXI BRUSH ATTACHED TO A FLEXIBLE PLASTIC SHEATH DESIGNED TO SAFELY REMOVE DEBRIS FROM FLEXIBLE ENDOSCOPES.

## (undated) DEVICE — HI-TORQUE PILOT 200 GUIDE WIRE .014 STRAIGHT TIP 3.0 CM X 190 CM: Brand: HI-TORQUE PILOT